# Patient Record
Sex: MALE | Race: WHITE
[De-identification: names, ages, dates, MRNs, and addresses within clinical notes are randomized per-mention and may not be internally consistent; named-entity substitution may affect disease eponyms.]

---

## 2019-04-07 ENCOUNTER — HOSPITAL ENCOUNTER (INPATIENT)
Dept: HOSPITAL 97 - ER | Age: 79
LOS: 10 days | Discharge: NURSING, REHAB OR LONG-TERM CARE | DRG: 470 | End: 2019-04-17
Attending: FAMILY MEDICINE | Admitting: INTERNAL MEDICINE
Payer: MEDICARE
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
## 2019-04-07 DIAGNOSIS — E78.5: ICD-10-CM

## 2019-04-07 DIAGNOSIS — R13.13: ICD-10-CM
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
## 2019-04-07 DIAGNOSIS — G20: ICD-10-CM
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
## 2019-04-07 DIAGNOSIS — E11.8: ICD-10-CM

## 2019-04-07 DIAGNOSIS — S72.041A: Primary | ICD-10-CM
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
## 2019-04-07 DIAGNOSIS — J43.9: ICD-10-CM

## 2019-04-07 DIAGNOSIS — Z87.891: ICD-10-CM

## 2019-04-07 DIAGNOSIS — R31.0: ICD-10-CM

## 2019-04-07 DIAGNOSIS — G40.909: ICD-10-CM

## 2019-04-07 DIAGNOSIS — R13.11: ICD-10-CM

## 2019-04-07 DIAGNOSIS — D63.8: ICD-10-CM

## 2019-04-07 DIAGNOSIS — F02.80: ICD-10-CM

## 2019-04-07 DIAGNOSIS — T83.83XA: ICD-10-CM

## 2019-04-07 LAB
BUN BLD-MCNC: 19 MG/DL (ref 7–18)
GLUCOSE SERPLBLD-MCNC: 136 MG/DL (ref 74–106)
HCT VFR BLD CALC: 40.5 % (ref 39.6–49)
INR BLD: 1.06
LYMPHOCYTES # SPEC AUTO: 1.1 K/UL (ref 0.7–4.9)
PMV BLD: 7.3 FL (ref 7.6–11.3)
POTASSIUM SERPL-SCNC: 4.5 MMOL/L (ref 3.5–5.1)
RBC # BLD: 4.61 M/UL (ref 4.33–5.43)

## 2019-04-07 PROCEDURE — 76770 US EXAM ABDO BACK WALL COMP: CPT

## 2019-04-07 PROCEDURE — 97530 THERAPEUTIC ACTIVITIES: CPT

## 2019-04-07 PROCEDURE — 71045 X-RAY EXAM CHEST 1 VIEW: CPT

## 2019-04-07 PROCEDURE — 88311 DECALCIFY TISSUE: CPT

## 2019-04-07 PROCEDURE — 99285 EMERGENCY DEPT VISIT HI MDM: CPT

## 2019-04-07 PROCEDURE — 87070 CULTURE OTHR SPECIMN AEROBIC: CPT

## 2019-04-07 PROCEDURE — 97167 OT EVAL HIGH COMPLEX 60 MIN: CPT

## 2019-04-07 PROCEDURE — 94640 AIRWAY INHALATION TREATMENT: CPT

## 2019-04-07 PROCEDURE — 87086 URINE CULTURE/COLONY COUNT: CPT

## 2019-04-07 PROCEDURE — 36415 COLL VENOUS BLD VENIPUNCTURE: CPT

## 2019-04-07 PROCEDURE — 92611 MOTION FLUOROSCOPY/SWALLOW: CPT

## 2019-04-07 PROCEDURE — 87205 SMEAR GRAM STAIN: CPT

## 2019-04-07 PROCEDURE — 85018 HEMOGLOBIN: CPT

## 2019-04-07 PROCEDURE — 80048 BASIC METABOLIC PNL TOTAL CA: CPT

## 2019-04-07 PROCEDURE — 70450 CT HEAD/BRAIN W/O DYE: CPT

## 2019-04-07 PROCEDURE — 88305 TISSUE EXAM BY PATHOLOGIST: CPT

## 2019-04-07 PROCEDURE — 72170 X-RAY EXAM OF PELVIS: CPT

## 2019-04-07 PROCEDURE — 72125 CT NECK SPINE W/O DYE: CPT

## 2019-04-07 PROCEDURE — 74230 X-RAY XM SWLNG FUNCJ C+: CPT

## 2019-04-07 PROCEDURE — 97110 THERAPEUTIC EXERCISES: CPT

## 2019-04-07 PROCEDURE — 97116 GAIT TRAINING THERAPY: CPT

## 2019-04-07 PROCEDURE — 93005 ELECTROCARDIOGRAM TRACING: CPT

## 2019-04-07 PROCEDURE — 80053 COMPREHEN METABOLIC PANEL: CPT

## 2019-04-07 PROCEDURE — 82962 GLUCOSE BLOOD TEST: CPT

## 2019-04-07 PROCEDURE — 81003 URINALYSIS AUTO W/O SCOPE: CPT

## 2019-04-07 PROCEDURE — 81015 MICROSCOPIC EXAM OF URINE: CPT

## 2019-04-07 PROCEDURE — 87088 URINE BACTERIA CULTURE: CPT

## 2019-04-07 PROCEDURE — 83735 ASSAY OF MAGNESIUM: CPT

## 2019-04-07 PROCEDURE — 97112 NEUROMUSCULAR REEDUCATION: CPT

## 2019-04-07 PROCEDURE — 76857 US EXAM PELVIC LIMITED: CPT

## 2019-04-07 PROCEDURE — 97163 PT EVAL HIGH COMPLEX 45 MIN: CPT

## 2019-04-07 PROCEDURE — 85610 PROTHROMBIN TIME: CPT

## 2019-04-07 PROCEDURE — 85025 COMPLETE CBC W/AUTO DIFF WBC: CPT

## 2019-04-07 PROCEDURE — 92610 EVALUATE SWALLOWING FUNCTION: CPT

## 2019-04-07 PROCEDURE — 85014 HEMATOCRIT: CPT

## 2019-04-07 PROCEDURE — 96374 THER/PROPH/DIAG INJ IV PUSH: CPT

## 2019-04-07 NOTE — EDPHYS
Physician Documentation                                                                           

 [facility]                                                                 

Name: Riky Castro                                                                             

Age: 78 yrs                                                                                       

Sex: Male                                                                                         

: 1940                                                                                   

MRN: [id number]                                                                                   

Arrival Date: 2019                                                                          

Time: 20:36                                                                                       

Account#: [id number]                                                                            

Bed 8                                                                                             

Private MD:                                                                                       

ED Physician Jayesh Driscoll                                                                       

HPI:                                                                                              

                                                                                             

22:29 This 78 yrs old  Male presents to ER via EMS with complaints of Fall Injury.   gs  

22:29 Details of fall: The patient fell from an upright position, while walking, fell down    gs  

      2-3 steps. Onset: The symptoms/episode began/occurred acutely, just prior to arrival.       

      Associated injuries: The patient sustained injury to the head, neck injury, right hip,      

      obvious fracture. Severity of symptoms: At their worst the symptoms were severe, in the     

      emergency department the symptoms are unchanged. The patient has not experienced            

      similar symptoms in the past. The patient has not recently seen a physician.                

                                                                                                  

Historical:                                                                                       

- Allergies:                                                                                      

20:48 No Known Allergies;                                                                     fc  

- Home Meds:                                                                                      

20:48 Colace 100 mg oral cap 1 cap once daily [Active]; aspirin 81 mg Oral TbEC 1 tab once    fc  

      daily [Active]; montelukast 10 mg oral tab 1 tab once daily [Active]; lisinopril 10 mg      

      Oral tab 1 tab once daily [Active]; rosuvastatin 20 mg oral tab 1 tab once daily            

      [Active]; ezetimibe oral oral 1 tab once daily [Active]; donepezil 10 mg oral tab 1 tab     

      once daily [Active]; carbidopa-levodopa  mg Oral tab 2 tabs 4 times per day           

      [Active]; Phenytoin  mg cap Oral 4 cap daily [Active];                                

- PMHx:                                                                                           

20:48 Parkinsons; High Cholesterol; Hyperlipidemia; Hypertension; Diabetes - NIDDM; Seizures; fc  

- PSHx:                                                                                           

20:48 Appendectomy;                                                                           fc  

                                                                                                  

- Immunization history: Last tetanus immunization: unknown.                                       

- Social history:: Smoking status: Patient/guardian denies using tobacco,                         

  Patient/guardian denies using alcohol, street drugs.                                            

- Ebola Screening: : Patient negative for fever greater than or equal to 101.5 degrees            

  Fahrenheit, and additional compatible Ebola Virus Disease symptoms Patient denies               

  exposure to infectious person Patient denies travel to an Ebola-affected area in the            

  21 days before illness onset.                                                                   

                                                                                                  

                                                                                                  

ROS:                                                                                              

22:29 All other systems are negative.                                                         gs  

                                                                                                  

Exam:                                                                                             

22:29 Head/Face:  Normocephalic, atraumatic. Eyes:  Pupils equal round and reactive to light, gs  

      extra-ocular motions intact.  Lids and lashes normal.  Conjunctiva and sclera are           

      non-icteric and not injected.  Cornea within normal limits.  Periorbital areas with no      

      swelling, redness, or edema. ENT:  Nares patent. No nasal discharge, no septal              

      abnormalities noted.  Tympanic membranes are normal and external auditory canals are        

      clear.  Oropharynx with no redness, swelling, or masses, exudates, or evidence of           

      obstruction, uvula midline.  Mucous membranes moist. Chest/axilla:  Normal chest wall       

      appearance and motion.  Nontender with no deformity.  No lesions are appreciated.           

      Cardiovascular:  Regular rate and rhythm with a normal S1 and S2.  No gallops, murmurs,     

      or rubs.  Normal PMI, no JVD.  No pulse deficits. Respiratory:  Lungs have equal breath     

      sounds bilaterally, clear to auscultation and percussion.  No rales, rhonchi or wheezes     

      noted.  No increased work of breathing, no retractions or nasal flaring. Abdomen/GI:        

      Soft, non-tender, with normal bowel sounds.  No distension or tympany.  No guarding or      

      rebound.  No evidence of tenderness throughout. Back:  No spinal tenderness.  No            

      costovertebral tenderness.  Full range of motion.                                           

22:29 Skin:  Warm, dry with normal turgor.  Normal color with no rashes, no lesions, and no       

      evidence of cellulitis. Neuro:  Awake and alert, GCS 15, oriented to person, place,         

      time, and situation.  Cranial nerves II-XII grossly intact.  Motor strength 5/5 in all      

      extremities.  Sensory grossly intact.  Cerebellar exam normal.  Normal gait.                

22:29 Constitutional: The patient appears alert, awake, in obvious distress, severely             

      distressed.                                                                                 

22:29 Neck: C-spine: C-collar placed PTA, Back board PTA                                          

22:29 Musculoskeletal/extremity: Circulation is intact in all extremities. Sensation intact.      

      Joints: the  right hip displays deformity, limited range of motion, pain at rest,           

      painful range of motion, tenderness.                                                        

                                                                                                  

Vital Signs:                                                                                      

20:30  / 76; Pulse 93; Resp 18; Temp 98.4(O); Pulse Ox 99% on R/A; Weight 72.57 kg (R); fc  

      Height 6 ft. 2 in. (187.96 cm) (R); Pain 3/10;                                              

22:00  / 87; Pulse 93; Resp 18; Pulse Ox 97% on R/A;                                    lp1 

22:45  / 80; Pulse 92; Resp 18; Pulse Ox 98% on R/A;                                    lp1 

23:30  / 71; Pulse 96; Resp 18; Pulse Ox 98% on R/A; Pain 9/10;                         lp1 

04                                                                                             

00:31  / 72; Pulse 92; Resp 18; Pulse Ox 98% on R/A;                                    lp1 

                                                                                             

20:30 Body Mass Index 20.54 (72.57 kg, 187.96 cm)                                             fc  

                                                                                                  

[employer] Coma Score:                                                                               

                                                                                             

20:30 Eye Response: spontaneous(4). Verbal Response: oriented(5). Motor Response: obeys       fc  

      commands(6). Total: 15.                                                                     

                                                                                                  

Trauma Score (Adult):                                                                             

20:30 Eye Response: spontaneous(1); Verbal Response: oriented(1); Motor Response: obeys       fc  

      commands(2); Systolic BP: > 89 mm Hg(4); Respiratory Rate: 10 to 29 per min(4); Sandy     

      Score: 15; Trauma Score: 12                                                                 

22:00 Eye Response: spontaneous(1); Verbal Response: oriented(1); Motor Response: obeys       lp1 

      commands(2); Systolic BP: > 89 mm Hg(4); Respiratory Rate: 10 to 29 per min(4); [employer]     

      Score: 15; Trauma Score: 12                                                                 

                                                                                                  

MDM:                                                                                              

20:42 Patient medically screened.                                                             gs  

22:29 Differential diagnosis: closed head injury, fracture, multiple trauma, sprain. Data     gs  

      reviewed: vital signs, nurses notes. Counseling: I had a detailed discussion with the       

      patient and/or guardian regarding: the historical points, exam findings, and any            

      diagnostic results supporting the discharge/admit diagnosis, lab results, radiology         

      results, the need for further work-up and treatment in the hospital. Response to            

      treatment: the patient's symptoms have mildly improved after treatment, and as a            

      result, I will admit patient.                                                               

                                                                                                  

                                                                                             

20:45 Order name: Basic Metabolic Panel; Complete Time: 23:08                                   

                                                                                             

20:45 Order name: CBC with Diff; Complete Time: 23:08                                           

                                                                                             

20:45 Order name: Protime (+inr); Complete Time: 23:09                                          

                                                                                             

20:45 Order name: XRAY CXR (1 view); Complete Time: 21:57                                       

                                                                                             

20:45 Order name: Hip Right 2 View XRAY; Complete Time: 21:57                                   

                                                                                             

20:45 Order name: CT Head C Spine                                                               

                                                                                             

20:45 Order name: EKG - Nurse/Tech; Complete Time: 22:08                                        

                                                                                                  

Administered Medications:                                                                         

21:46 Drug: morphine 4 mg Route: IVP; Site: right antecubital;                                lp1 

22:10 Follow up: Response: Pain is unchanged, physician notified                              lp1 

23:12 Drug: morphine 4 mg Route: IVP; Site: right antecubital;                                lp1 

23:40 Follow up: Response: Pain is unchanged, physician notified                              lp1 

                                                                                                  

                                                                                                  

Disposition:                                                                                      

19 22:39 Hospitalization ordered by Damian London for Inpatient Admission. Preliminary    

  diagnosis is Displaced fracture of base of neck of right femur.                                 

- Bed requested for Telemetry/MedSurg (Inpatient).                                                

- Status is Inpatient Admission.                                                              lp1 

- Condition is Stable.                                                                            

- Problem is new.                                                                                 

- Symptoms have improved.                                                                         

UTI on Admission? No                                                                              

                                                                                                  

                                                                                                  

                                                                                                  

Critical care time excluding procedures:                                                          

22:40 Critical care time: Bedside Care: 10 minutes, Consultation: 10 minutes, Family          gs  

      Intervention: 10 minutes. Total time: 30 minutes                                            

                                                                                                  

Signatures:                                                                                       

Dispatcher [employer]                           EDBarbara Hassan RN RN                                                      

Che Cartagena RN                         RN   [facility]                                                  

Crystal Morris RN RN                                                      

Jayesh Driscoll MD MD                                                      

                                                                                                  

Corrections: (The following items were deleted from the chart)                                    

23:17 22:39 Hospitalization Ordered by Damian London MD for Inpatient Admission. Preliminary cg  

      diagnosis is Displaced fracture of base of neck of right femur. Bed requested for           

      Telemetry/MedSurg (Inpatient). Status is Inpatient Admission. Condition is Stable.          

      Problem is new. Symptoms have improved. UTI on Admission? No.                             

                                                                                             

00:31  23:17 2019 22:39 Hospitalization Ordered by Damian London MD for Inpatient lp1 

      Admission. Preliminary diagnosis is Displaced fracture of base of neck of right femur.      

      Bed requested for Telemetry/MedSurg (Inpatient). Status is Inpatient Admission.             

      Condition is Stable. Problem is new. Symptoms have improved. UTI on Admission? No. cg       

                                                                                                  

**************************************************************************************************

## 2019-04-07 NOTE — ER
Diarrea en los niños  (Diarrhea, Child)  La diarrea consiste en evacuaciones intestinales frecuentes, blandas o acuosas. La diarrea puede hacer que el guillermo se sienta débil o puede deshidratarlo. La deshidratación puede provocarle cansancio y sed. El guillermo también puede orinar con menos frecuencia y tener sequedad en la boca. Generalmente, la diarrea dura 2 a 3 días. Sin embargo, puede durar más tiempo si se trata de un signo de algo más ky. Es importante tratar la diarrea nimco se lo haya indicado el pediatra.  INSTRUCCIONES PARA EL CUIDADO EN EL HOGAR  Comida y bebida   Siga estas recomendaciones nimco se lo haya indicado el pediatra:  · Si se lo indicaron, araceli al guillermo jackie solución de rehidratación oral (SRO). Esta es jackie bebida que se vende en farmacias y tiendas.  · Para prevenir la deshidratación, aliente al guillermo a beber mucho líquido. Evite darle al guillermo líquidos que contengan mucha azúcar o cafeína, nimco jugos y refrescos.  · Si el guillermo es pequeño, continúe amamantándolo o dándole leche maternizada. No le dé al guillermo más agua.  · Continúe alimentando guillermo nimco lo hace normalmente, nithin evite que consuma alimentos picantes o grasos, nimco linda fritas o pizza.  Instrucciones generales   · Asegúrese de que usted y el guillermo se laven las sveta con frecuencia. Use desinfectante para sveta si no dispone de agua y jabón.  · Asegúrese de que todas las personas que viven en farmer casa se laven diandra las sveta y con frecuencia.  · Administre los medicamentos de venta cornel y los recetados solamente nimco se lo haya indicado el pediatra.  · Kenyon que el guillermo tome un baño caliente para ayudar a disminuir el ardor o dolor causado por los episodios frecuentes de diarrea.  · Controle la afección del guillermo para detectar cualquier cambio.  · Kenyon que el guillermo pratibha la suficiente cantidad de líquido para mantener la orina de color inge o amarillo pálido.  · Concurra a todas las visitas de control nimco se lo haya indicado el pediatra.  Nurse's Notes                                                                                     

 [facility]                                                                 

Name: Riky Castro                                                                             

Age: 78 yrs                                                                                       

Sex: Male                                                                                         

: 1940                                                                                   

MRN: [id number]                                                                                   

Arrival Date: 2019                                                                          

Time: 20:36                                                                                       

Account#: [id number]                                                                            

Bed 8                                                                                             

Private MD:                                                                                       

Diagnosis: Displaced fracture of base of neck of right femur                                      

                                                                                                  

Presentation:                                                                                     

                                                                                             

20:30 Presenting complaint: EMS states: that pt was going down 3 steps and slipped. Negative  fc  

      LOC or dizziness. C/O pain to right hip. Noted shorting and external rotation. Care         

      prior to arrival: Cervical collar in place. Placed on backboard. Medication(s) given:       

      zofran 4 mg, Fentanyl 75 mcg IV initiated. 20 GA, in the right forearm, Glucose check:      

      118. Mechanism of Injury: Fall down 3 steps. Trauma event details: Injury occurred in       

      the [facility], Injury occurred: at home. Injury occurred: 2019           

      Injury occurred at: 19:55.                                                                  

20:30 Acuity: ZAC 2                                                                           fc  

20:30 Method Of Arrival: EMS: [facility]  

20:30 Transition of care: patient was not received from another setting of care. Onset of     fc  

      symptoms was 2019 at 19:55. Risk Assessment: Do you want to hurt yourself or      

      someone else? Patient reports no desire to harm self or others. Initial Sepsis Screen:      

      Does the patient meet any 2 criteria? HR > 90 bpm. No. Patient's initial sepsis screen      

      is negative. Does the patient have a suspected source of infection? No. Patient's           

      initial sepsis screen is negative.                                                          

                                                                                                  

Trauma Activation: Alert                                                                          

 Physician: ED Physician; Name: Yamila; Notified At:  20:34; Arrived At:                 

   20:34                                                                                

 Physician: General Surgeon; Name: ; Notified At:  20:34; Arrived At:                   

 Physician: Radiology; Name: Georgie; Notified At:  20:34; Arrived At:                   

   20:34                                                                                

 Physician: Respiratory; Name: ; Notified At:  20:34; Arrived At:                       

 Physician: Lab; Name: ; Notified At:  20:34; Arrived At:                               

                                                                                                  

Historical:                                                                                       

- Allergies:                                                                                      

20:48 No Known Allergies;                                                                     fc  

- Home Meds:                                                                                      

20:48 Colace 100 mg oral cap 1 cap once daily [Active]; aspirin 81 mg Oral TbEC 1 tab once    fc  

      daily [Active]; montelukast 10 mg oral tab 1 tab once daily [Active]; lisinopril 10 mg      

      Oral tab 1 tab once daily [Active]; rosuvastatin 20 mg oral tab 1 tab once daily            

      [Active]; ezetimibe oral oral 1 tab once daily [Active]; donepezil 10 mg oral tab 1 tab     

      once daily [Active]; carbidopa-levodopa  mg Oral tab 2 tabs 4 times per day           

      [Active]; Phenytoin  mg cap Oral 4 cap daily [Active];                                

- PMHx:                                                                                           

20:48 Parkinsons; High Cholesterol; Hyperlipidemia; Hypertension; Diabetes - NIDDM; Seizures; fc  

- PSHx:                                                                                           

20:48 Appendectomy;                                                                           fc  

                                                                                                  

- Immunization history: Last tetanus immunization: unknown.                                       

- Social history:: Smoking status: Patient/guardian denies using tobacco,                         

  Patient/guardian denies using alcohol, street drugs.                                            

- Ebola Screening: : Patient negative for fever greater than or equal to 101.5 degrees            

  Fahrenheit, and additional compatible Ebola Virus Disease symptoms Patient denies               

  exposure to infectious person Patient denies travel to an Ebola-affected area in the            

  21 days before illness onset.                                                                   

                                                                                                  

                                                                                                  

Screenin:30 Abuse screen: Denies threats or abuse. Tuberculosis screening: No symptoms or risk      fc  

      factors identified.                                                                         

20:42 Nutritional screening: No deficits noted. Fall Risk Fall in past 12 months (25 points). fc  

      Secondary diagnosis (15 points) impaired mobility, Parkinsons. IV access (20 points).       

      Ambulatory Aid- None/Bed Rest/Nurse Assist (0 pts). Gait- Weak (10 pts.). Mental            

      Status- Overestimates/Forgets Limitations (15 pts.). Total Bahena Fall Scale indicates       

      High Risk Score (45 or more points). Fall prevention measures have been instituted.         

      Side Rails Up X 2 Placed Close to Nursing Station Frequent Obs/Assessments Occuring         

      Family Present and informed to notify staff if the need to leave the bedside As             

      available patient and family educated on Fall Prevention Program and Strategies.            

                                                                                                  

Primary Survey:                                                                                   

20:30 NO uncontrolled hemorrhage observed. A: The patient is alert. Airway: patent, No        lp1 

      supplemental oxygen in use on arrival. Breathing/Chest: Respiratory pattern: regular,       

      Respiratory effort: spontaneous, unlabored, Breath sounds: clear, bilaterally. Chest        

      inspection: symmetrical rise and fall of the chest. Circulation: Skin temperature:          

      warm, dry. Disability Alert. Exposure/Environment: Obvious injury(ies) are noted at         

      this time: Right leg noted to be externally rotated; pulses palpable to dorsalis pedis.     

21:30 Reassessment Airway Airway Patent Breathing/Chest Respiratory pattern Regular           lp1 

      Respiratory effort Spontaneous Unlabored Circulation Temperature Warm Dry Disability        

      Alert.                                                                                      

                                                                                                  

Secondary Survey:                                                                                 

20:30 HEENT: No deficits noted. Gastrointestinal: No deficits noted. : No deficits noted.   lp1 

      Musculoskeletal: Range of motion: limited in right hip.                                     

                                                                                                  

Assessment:                                                                                       

20:30 General: Appears uncomfortable, Behavior is appropriate for age. Pain: Complains of     lp1 

      pain in right hip Pain currently is 9 out of 10 on a pain scale. Neuro: Level of            

      Consciousness is awake, alert, obeys commands, Oriented to person, place, situation,        

      Pupils are PERRLA. EENT: No deficits noted. Cardiovascular: Patient's skin is warm and      

      dry. Pulses are 2+ in right dorsalis pedis artery and left dorsalis pedis artery.           

      Respiratory: Airway is patent Respiratory effort is even, unlabored, Respiratory            

      pattern is regular, symmetrical, Breath sounds are clear bilaterally. GI: Abdomen is        

      flat. : No deficits noted. Derm: Skin is fragile, is thin, Skin is dry, Skin is pale.     

      Musculoskeletal: Circulation, motion, and sensation intact. Capillary refill < 3            

      seconds, in bilateral toes.                                                                 

21:00 Reassessment: Neisha from [facility] called for update on patient; Will call [facility] 

      at [phone].                                                                            

21:45 Reassessment: Patient states discomfort with C-collar in place; aware of need for CT    lp1 

      results before removing C-collar;.                                                          

22:16 Reassessment: Patient states no relief from Morphine administered; continued pain to    lp1 

      right hip; Provider notified.                                                               

22:35 Reassessment: Patient appears more comfortable after removal of C-collar.               lp1 

23:32 Reassessment: Patient states no relief from Morphine administered; Patient and daughter lp1 

      aware of pending admission;.                                                                

23:33 Cardiovascular: Pulses are 2+ in right dorsalis pedis artery and left dorsalis pedis    lp1 

      artery.                                                                                     

                                                                                             

00:12 Reassessment: Neisha from [facility] updated on patient begin admitted to    [address]                                                                                   

                                                                                                  

Vital Signs:                                                                                      

                                                                                             

20:30  / 76; Pulse 93; Resp 18; Temp 98.4(O); Pulse Ox 99% on R/A; Weight 72.57 kg (R); fc  

      Height 6 ft. 2 in. (187.96 cm) (R); Pain 3/10;                                              

22:00  / 87; Pulse 93; Resp 18; Pulse Ox 97% on R/A;                                    lp1 

22:45  / 80; Pulse 92; Resp 18; Pulse Ox 98% on R/A;                                    1 

23:30  / 71; Pulse 96; Resp 18; Pulse Ox 98% on R/A; Pain 9/10;                         lp1 

04                                                                                             

00:31  / 72; Pulse 92; Resp 18; Pulse Ox 98% on R/A;                                    [facility] 

                                                                                             

20:30 Body Mass Index 20.54 (72.57 kg, 187.96 cm)                                             fc  

                                                                                                  

Sandy Coma Score:                                                                               

                                                                                             

20:30 Eye Response: spontaneous(4). Verbal Response: oriented(5). Motor Response: obeys       fc  

      commands(6). Total: 15.                                                                     

                                                                                                  

Trauma Score (Adult):                                                                             

20:30 Eye Response: spontaneous(1); Verbal Response: oriented(1); Motor Response: obeys       fc  

      commands(2); Systolic BP: > 89 mm Hg(4); Respiratory Rate: 10 to 29 per min(4); [place]     

      Score: 15; Trauma Score: 12                                                                 

22:00 Eye Response: spontaneous(1); Verbal Response: oriented(1); Motor Response: obeys       lp1 

      commands(2); Systolic BP: > 89 mm Hg(4); Respiratory Rate: 10 to 29 per min(4); [place]     

      Score: 15; Trauma Score: 12                                                                 

                                                                                                  

ED Course:                                                                                        

20:30 Patient has correct armband on for positive identification. Bed in low position. Call   fc  

      light in reach. Side rails up X2.                                                           

20:30 Arm band placed on Patient placed in an exam room, on a stretcher.                      fc  

20:30 Patient maintains SpO2 saturation greater than 95% on room air.                         fc  

20:32 Thermoregulation: warm blanket given to patient.                                        fc  

20:36 Patient arrived in ED.                                                                  fc  

20:36 Jayesh Driscoll MD is Attending Physician.                                              gs  

20:39 Triage completed.                                                                       fc  

20:42 Cardiac monitor on. Pulse ox on. NIBP on.                                               fc  

21:00 Maintain EMS IV. Dressing intact. Site clean \T\ dry. Gauge \T\ site: 20g R FA.             lp
1

21:03 XRAY CXR (1 view) In Process Unspecified.                                               EDMS

21:03 Hip Right 2 View XRAY In Process Unspecified.                                           EDMS

21:06 CT Head C Spine In Process Unspecified.                                                 EDMS

21:07 Che Cartagena, RN is Primary Nurse.                                                       lp1 

21:53 Inserted saline lock: 20 gauge in right antecubital area, using aseptic technique.      ag4 

      Blood collected.                                                                            

21:53 EKG done, by ED staff, reviewed by Jayesh Driscoll MD.                                    ag4 

22:39 Damian London MD is Hospitalizing Provider.                                          gs  

23:31 No provider procedures requiring assistance completed. Patient admitted, IV remains in  lp1 

      place.                                                                                      

                                                                                                  

Administered Medications:                                                                         

21:46 Drug: morphine 4 mg Route: IVP; Site: right antecubital;                                lp1 

22:10 Follow up: Response: Pain is unchanged, physician notified                              lp1 

23:12 Drug: morphine 4 mg Route: IVP; Site: right antecubital;                                lp1 

23:40 Follow up: Response: Pain is unchanged, physician notified                              lp1 

                                                                                                  

                                                                                                  

Intake:                                                                                           

23:30 PO: 0ml; Total: 0ml.                                                                    lp1 

                                                                                                  

Output:                                                                                           

23:30 Urine: 0ml; Total: 0ml.                                                                 lp1 

                                                                                                  

Outcome:                                                                                          

22:39 Decision to Hospitalize by Provider.                                                    gs  

23:32 Condition: stable                                                                       lp1 

23:32 Instructed on the need for admit.                                                           

23:44 Patient's length of stay in the Emergency Department was greater than 2 hours. pending  lp1 

      imaging results Patient's length of stay extended due to                                    

04/                                                                                             

00:14 Admitted to Tele accompanied by tech, via stretcher, room 422, with chart, Report       lp1 

      called to  Aleyda Dover RN by Mary Kate Goff RN                                             

00:31 Patient left the ED.                                                                    lp1 

                                                                                                  

Signatures:                                                                                       

Dispatcher [employer]                           Barbara Johnson, EMELY HAN                                                      

Che Cartagena, EMELY                         RN   lp1                                                  

Jayesh Driscoll MD MD                                                      

Fahad España                                 ag4                                                  

                                                                                                  

Corrections: (The following items were deleted from the chart)                                    

                                                                                             

23:34 20:30 Cardiovascular: Patient's skin is warm and dry. Pulses are absent in right        lp1 

      dorsalis pedis artery and left dorsalis pedis artery lp1                                    

23:34 23:32 Reassessment: Patient states no relief from Morphine administered; Patient and    lp1 

      daughter aware of pending admission; lp1                                                    

                                                                                                  

************************************************************************************************** [place] es importante.  SOLICITE ATENCIÓN MÉDICA SI:  · La diarrea del guillermo dura más de 3 días.  · El guillermo tiene fiebre.  · El guillermo no quiere beber líquido o no puede retener líquido.  · El guillermo se siente confundido o mareado.  · El guillermo tiene dolor de shona.  · El guillermo tiene calambres musculares.  SOLICITE ATENCIÓN MÉDICA DE INMEDIATO SI:  · Nota signos de deshidratación en el guillermo, tales nimco:  ¨ Ausencia de orina en un lapso de 8 a 12 horas.  ¨ Labios agrietados.  ¨ Ausencia de lágrimas cuando llora.  ¨ Boca seca.  ¨ Ojos hundidos.  ¨ Somnolencia.  ¨ Debilidad.  · El guillermo comienza a vomitar.  · Las heces del guillermo tienen willi o son de color christopher, o tienen aspecto alquitranado.  · El guillermo tiene dolor en el abdomen.  · El guillermo tiene dificultad para respirar o respira muy rápidamente.  · El corazón del guillermo late muy rápidamente.  · La piel del guillermo se siente fría y húmeda.  · El guillermo parece estar confundido.  Esta información no tiene nimco fin reemplazar el consejo del médico. Asegúrese de hacerle al médico cualquier pregunta que tenga.  Document Released: 09/27/2006 Document Revised: 04/10/2017 Document Reviewed: 08/23/2016  [employer] Interactive Patient Education © 2017 [employer]      Opciones de alimentos para ayudar a aliviar la diarrea - Niños  (Food Choices to Help Relieve Diarrhea, Pediatric)  Cuando el guillermo tiene diarrea, los alimentos que ingiere son de gran importancia. Elegir los alimentos y las bebidas adecuados ayuda a aliviar la diarrea del guillermo. Asegurarse de que pratibha abundante cantidad de líquidos también es importante. Es fácil que un guillermo con diarrea pierda gran cantidad de líquido y se deshidrate.  ¿QUÉ PAUTAS GENERALES MARGIE SEGUIR?  Si el guillermo es rochelle de 1 año:  · Siga alimentándolo con leche materna o leche maternizada nimco siempre.  · Puede darle al bebé jackie solución de rehidratación oral para ayudar a mantenerlo hidratado. Esta solución se puede comprar en las farmacias, en las tiendas  minoristas y por Internet.  · No le dé al bebé jugos, bebidas deportivas ni refrescos. Estas bebidas pueden empeorar la diarrea.  · Si come algunos alimentos sólidos, puede seguir ofreciéndole esos alimentos si no empeoran la diarrea. Algunos alimentos recomendados son el arroz, los guisantes, las linda, el arleth o los huevos. No le dé al bebé alimentos con alto contenido de grasas, fibras o azúcar. Si el guillermo tiene heces acuosas cada vez que come, amamántelo o aliméntelo con la leche maternizada nimco siempre. Ofrézcale alimentos sólidos cuando las heces aneudy sólidas  Si el guillermo tiene 1 año o más:  Fluidos  · Dé al guillermo 1 taza (8 onzas) de líquido por cada episodio de diarrea.  · Asegúrese de que el guillermo pratibha la suficiente cantidad de líquido para mantener la orina de color inge o amarillo pálido.  · Puede darle al guillermo jackie solución de rehidratación oral para ayudar a mantenerlo hidratado. Esta solución se puede comprar en las farmacias, en las tiendas minoristas y por Internet.  · Evite darle bebidas que contengan azúcar, nimco las bebidas deportivas, los jugos de frutas, los productos lácteos enteros y las gaseosas.  · Evite darle al guillermo bebidas con cafeína.  Alimentos  · Evite darle al guillermo alimentos y bebidas que se muevan rápidamente por el tubo digestivo. [place] podría empeorar la diarrea. Entre los que se incluyen:  ¨ Bebidas con cafeína.  ¨ Alimentos ricos en fibra, nimco frutas y vegetales, nueces, semillas, panes y cereales integrales.  ¨ Alimentos y bebidas endulzados con alcoholes de azúcar, tales nimco xilitol, sorbitol, y manitol.  · [place] al guillermo alimentos que ayuden a espesar las heces. Estos incluyen puré de manzanas y alimentos con almidón, nimco arroz, tostadas, pasta, cereales bajos en azúcar, saba, sémola de maíz, linda al horno, galletas y panecillos.  · Cuando dé al guillermo alimentos hechos con granos, asegúrese de que tengan menos de 2 g de fibra por porción.  · Agregue alimentos ricos en  probióticos (nimco el yogur y los productos lácteos fermentados) a la dieta del guillermo para ayudar a aumentar las bacterias saludables en el tracto gastrointestinal.  · [place] que el guillermo coma pequeñas cantidades de comida con frecuencia.  · No dé al guillermo alimentos que estén muy calientes o muy fríos. Estos pueden irritar aún más la membrana que cubre el estómago.  ¿QUÉ ALIMENTOS SE RECOMIENDAN?  Solo araceli al guillermo alimentos que aneudy adecuados para farmer edad. Si tiene preguntas acerca de un alimento, hable con el nutricionista o el pediatra.  Cereales  Panes y productos hechos con harina marion. Fideos. Arroz ortiz. Galletas saladas. Pretzels. Saba. Cereales fríos. Galletas Brady.  Vegetales  Puré de linda sin cáscara. Vegetales diandra cocidos sin semillas ni cáscara. Jugo de vegetales.  Frutas  Melón. Puré de manzana. Banana. Jugo de frutas (excepto el jugo de ciruela) sin pulpa. Frutas en compota.  Keila y otros alimentos con proteínas  Huevo trista. Keila blandas diandra cocidas. Pescado, huevo o productos de soja hechos sin grasa añadida. Mantequilla de lyn secos, sin trozos.  Lácteos  Leche materna o leche maternizada. Yecenia de leche. Leche semidescremada, descremada, en polvo y evaporada. Leche de soja. Leche sin lactosa. Yogur con cultivos vivos activos. Queso. Helado bajo en grasa.  Bebidas  Bebidas sin cafeína. Bebidas rehidratantes.  Grasas y aceites  Aceite. Mantequilla. Queso crema. Margarina. Mayonesa.  Los artículos mencionados arriba pueden no ser jackie lista completa de las bebidas o los alimentos recomendados. Comuníquese con el nutricionista para conocer más opciones.  ¿QUÉ ALIMENTOS NO SE RECOMIENDAN?  Cereales  Pan de salvado o integral, panecillos, galletas o pasta. Arroz integral o sae. Cebada, saba y otros cereales integrales. Cereales hechos de granos integrales o salvado. Panes o cereales hechos con semillas y lyn secos. Palomitas de maíz.  Vegetales  Vegetales crudos. Verduras fritas.  Remolachas. Brócoli. Repollitos de Bruselas. Repollo. Coliflor. Hojas de berza, mostaza o nabo. Maíz. Cáscara de linda.  Frutas  Todas las frutas crudas, excepto las bananas y los melones. Frutas secas, incluidas las ciruelas y las pasas. Jugo de ciruelas. Jugo de frutas con pulpa. Frutas en almíbar espeso.  Keila y otras mahoney de proteínas  Carne de kemal, aves o pescado. Embutidos (nimco la mortadela y el joesph). Salchicha y tocino. Perros calientes. Keila grasas. Lyn secos. Mantequillas de lyn secos espesas.  Lácteos  Leche entera. Mitad leche y mitad crema. Crema. Crema ácida. Helado común (leche entera). Yogur con lyn rojos, frutas secas o lyn secos.  Bebidas  Bebidas con cafeína, sorbitol o jarabe de maíz de alto contenido de fructosa.  Grasas y aceites  Comidas fritas. Alimentos grasosos.  Otros  Alimentos endulzados artificialmente con sorbitol o xilitol. Miel. Alimentos con cafeína, sorbitol o jarabe de maíz de alto contenido de fructosa.  Los artículos mencionados arriba pueden no ser jackie lista completa de las bebidas y los alimentos que se deben evitar. Comuníquese con el nutricionista para recibir más información.     Esta información no tiene nimco fin reemplazar el consejo del médico. Asegúrese de hacerle al médico cualquier pregunta que tenga.     Document Released: 12/18/2006 Document Revised: 01/08/2016  Elsevier Interactive Patient Education ©2016 [employer]

## 2019-04-07 NOTE — RAD REPORT
EXAM DESCRIPTION:  RAD - Hip Right 2 View - 4/7/2019 9:03 pm

 

CLINICAL HISTORY:  PAIN

Trauma, fall, pain

 

COMPARISON:  No comparisons

 

FINDINGS:  Subcapital fracture is present of the proximal right femur with varus angulation. No dislo
cation seen.

## 2019-04-07 NOTE — P.HP
Certification for Inpatient


Patient admitted to: Inpatient


With expected LOS: >2 Midnights


Practitioner: I am a practitioner with admitting privileges, knowledge of 

patient current condition, hospital course, and medical plan of care.


Services: Services provided to patient in accordance with Admission 

requirements found in Title 42 Section 412.3 of the Code of Federal Regulations





Patient History


Date of Service: 04/07/19


Reason for admission: right hip fracture


History of Present Illness: 





Mr Castro is a 78 years old male with history of COPD, HTN, dyslipidemia, 

seizure disorder, DM II, who unfortunately fell to the ground landing over his 

right side. Immediately he start feeling significant right hip pain, and he was 

unable to bear weight. He denied dizziness or palpitations prior to the fall. 

He states that misstep and fell. Lab work is mostly unremarkable. Right pelvic 

XR remarkable for subcapital fracture of right proximal femur. He is afebrile, 

/64, O2 sat 99% on RA.


Home medications list reviewed: Yes





- Past Medical/Surgical History


-: COPD


-: HTN


-: Parkinson's disease


-: dyslipidemia


-: DM II


-: seizures


-: appendectomy





- Family History


Family History: Reviewed- Non-Contributory





- Social History


Smoking Status: Former smoker


CD- Drugs: No


Place of Residence: Home





Review of Systems


10-point ROS is otherwise unremarkable





Physical Examination





- Physical Exam


General: Alert, In no apparent distress


HEENT: Atraumatic, PERRLA, Mucous membr. moist/pink, EOMI, Sclerae nonicteric


Neck: Supple, 2+ carotid pulse no bruit, No LAD, Without JVD or thyroid 

abnormality


Respiratory: Clear to auscultation bilaterally, Normal air movement


Cardiovascular: Regular rate/rhythm, Normal S1 S2


Gastrointestinal: Normal bowel sounds, No tenderness


Musculoskeletal: No tenderness


Integumentary: No rashes


Neurological: Normal gait, Normal speech, Normal strength at 5/5 x4 extr, 

Normal tone, Normal affect


Lymphatics: No axilla or inguinal lymphadenopathy





- Studies


Laboratory Data (last 24 hrs)





04/07/19 21:48: PT 12.5, INR 1.06


04/07/19 21:48: WBC 8.3, Hgb 13.5 L, Hct 40.5, Plt Count 427 H


04/07/19 21:48: Sodium 143, Potassium 4.5, BUN 19 H, Creatinine 0.73, Glucose 

136 H








Assessment and Plan





- Problems (Diagnosis)


(1) Hip fracture


Current Visit: Yes   Status: Acute   


Qualifiers: 


   Encounter type: initial encounter   Fracture type: closed   Laterality: 

right   Qualified Code(s): S72.001A - Fracture of unspecified part of neck of 

right femur, initial encounter for closed fracture   





(2) Diabetes mellitus


Current Visit: Yes   Status: Acute   


Qualifiers: 


   Diabetes mellitus type: type 2   Diabetes mellitus long term insulin use: 

without long term use   Diabetes mellitus complication status: with unspecified 

complications   Qualified Code(s): E11.8 - Type 2 diabetes mellitus with 

unspecified complications   





(3) Seizure disorder


Current Visit: Yes   Status: Acute   





(4) COPD (chronic obstructive pulmonary disease)


Current Visit: Yes   Status: Acute   


Qualifiers: 


   COPD type: emphysema   Emphysema type: unspecified   Qualified Code(s): 

J43.9 - Emphysema, unspecified   





(5) HTN (hypertension)


Current Visit: Yes   Status: Acute   


Qualifiers: 


   Hypertension type: essential hypertension   Qualified Code(s): I10 - 

Essential (primary) hypertension   





(6) Parkinson disease


Current Visit: Yes   Status: Acute   





- Plan





The patient will be admitted to the hospital due to right hip fracture. Will 

keep him NPO, start IV fluids, pain control, SSI for BS control. Dr Lucas was 

consulted, he will see the patient in AM.





- Advance Directives


Does patient have a Living Will: No


Does patient have a Durable POA for Healthcare: No





- Code Status/Comfort Care


Code Status Assessed: Yes


Code Status: Full Code

## 2019-04-07 NOTE — RAD REPORT
EXAM DESCRIPTION:  RAD - Chest Single View - 4/7/2019 9:03 pm

 

CLINICAL HISTORY:  MALAISE

Chest pain.

 

COMPARISON:  No comparisons

 

FINDINGS:  Portable technique limits examination quality.

 

Emphysematous changes are present throughout the lungs. The heart is normal in size. Healed posterior
 left rib fractures.

 

IMPRESSION:  COPD.

## 2019-04-08 LAB
BUN BLD-MCNC: 21 MG/DL (ref 7–18)
GLUCOSE SERPLBLD-MCNC: 133 MG/DL (ref 74–106)
HCT VFR BLD CALC: 39.2 % (ref 39.6–49)
LYMPHOCYTES # SPEC AUTO: 1.2 K/UL (ref 0.7–4.9)
MAGNESIUM SERPL-MCNC: 2 MG/DL (ref 1.8–2.4)
PMV BLD: 7.2 FL (ref 7.6–11.3)
POTASSIUM SERPL-SCNC: 4 MMOL/L (ref 3.5–5.1)
RBC # BLD: 4.42 M/UL (ref 4.33–5.43)
UA COMPLETE W REFLEX CULTURE PNL UR: (no result)
UA DIPSTICK W REFLEX MICRO PNL UR: (no result)

## 2019-04-08 RX ADMIN — HUMAN INSULIN SCH: 100 INJECTION, SOLUTION SUBCUTANEOUS at 06:00

## 2019-04-08 RX ADMIN — LISINOPRIL SCH MG: 10 TABLET ORAL at 11:42

## 2019-04-08 RX ADMIN — SODIUM CHLORIDE SCH MLS: 0.9 INJECTION, SOLUTION INTRAVENOUS at 01:13

## 2019-04-08 RX ADMIN — KETOROLAC TROMETHAMINE PRN MG: 30 INJECTION, SOLUTION INTRAMUSCULAR at 17:46

## 2019-04-08 RX ADMIN — DOCUSATE SODIUM SCH MG: 100 CAPSULE, LIQUID FILLED ORAL at 11:43

## 2019-04-08 RX ADMIN — CARBIDOPA AND LEVODOPA SCH TAB: 25; 100 TABLET ORAL at 17:11

## 2019-04-08 RX ADMIN — Medication SCH: at 21:00

## 2019-04-08 RX ADMIN — HUMAN INSULIN SCH: 100 INJECTION, SOLUTION SUBCUTANEOUS at 11:30

## 2019-04-08 RX ADMIN — HUMAN INSULIN SCH: 100 INJECTION, SOLUTION SUBCUTANEOUS at 21:00

## 2019-04-08 RX ADMIN — Medication SCH ML: at 09:00

## 2019-04-08 RX ADMIN — CARBIDOPA AND LEVODOPA SCH TAB: 25; 100 TABLET ORAL at 21:01

## 2019-04-08 RX ADMIN — HUMAN INSULIN SCH: 100 INJECTION, SOLUTION SUBCUTANEOUS at 00:00

## 2019-04-08 RX ADMIN — MONTELUKAST SODIUM SCH MG: 10 TABLET, FILM COATED ORAL at 11:43

## 2019-04-08 RX ADMIN — SODIUM CHLORIDE SCH MLS: 0.9 INJECTION, SOLUTION INTRAVENOUS at 21:01

## 2019-04-08 RX ADMIN — EZETIMIBE SCH MG: 10 TABLET ORAL at 11:42

## 2019-04-08 RX ADMIN — HUMAN INSULIN SCH: 100 INJECTION, SOLUTION SUBCUTANEOUS at 16:30

## 2019-04-08 RX ADMIN — SODIUM CHLORIDE SCH MLS: 0.9 INJECTION, SOLUTION INTRAVENOUS at 11:14

## 2019-04-08 RX ADMIN — KETOROLAC TROMETHAMINE PRN MG: 30 INJECTION, SOLUTION INTRAMUSCULAR at 10:58

## 2019-04-08 RX ADMIN — CARBIDOPA AND LEVODOPA SCH TAB: 25; 100 TABLET ORAL at 11:43

## 2019-04-08 NOTE — P.PN
Subjective


Date of Service: 04/08/19


Chief Complaint: right hip fracture


Subjective: No C/O voiced, Tolerating diet, Doing well, Other (Rescheduled for 

Surgery portia AM. Denies any SOB, CP, N/V or any other symptoms)





Review of Systems


10-point ROS is otherwise unremarkable





Physical Examination





- Vital Signs


Temperature: 98.6 F


Blood Pressure: 149/68


Pulse: 83


Respirations: 17


Pulse Ox (%): 99





- Physical Exam


General: Alert, In no apparent distress


HEENT: Atraumatic, PERRLA, EOMI


Neck: Supple, JVD not distended


Respiratory: Clear to auscultation bilaterally, Normal air movement


Cardiovascular: Regular rate/rhythm, Normal S1 S2


Gastrointestinal: Normal bowel sounds, No tenderness


Musculoskeletal: No tenderness


Integumentary: No rashes


Neurological: Normal speech, Normal tone, Normal affect


Lymphatics: No axilla or inguinal lymphadenopathy





- Studies


Laboratory Data (last 24 hrs)





04/07/19 21:48: PT 12.5, INR 1.06


04/07/19 21:48: WBC 8.3, Hgb 13.5 L, Hct 40.5, Plt Count 427 H


04/07/19 21:48: Sodium 143, Potassium 4.5, BUN 19 H, Creatinine 0.73, Glucose 

136 H





Medications List Reviewed: Yes





Assessment And Plan





- Current Problems (Diagnosis)


(1) Hip fracture


Current Visit: Yes   Status: Acute   


Plan: 


Hip Fracture s.p Fall 


-Fall precautions. 


-orthopedics Consulted. Reccs Appreciated 


   -OR procedure portia AM 


-NPO after midnight 


-repeat lab in AM 





Qualifiers: 


   Encounter type: initial encounter   Fracture type: closed   Laterality: 

right   Qualified Code(s): S72.001A - Fracture of unspecified part of neck of 

right femur, initial encounter for closed fracture   





(2) COPD (chronic obstructive pulmonary disease)


Current Visit: Yes   Status: Chronic   


Qualifiers: 


   COPD type: emphysema   Emphysema type: unspecified   Qualified Code(s): 

J43.9 - Emphysema, unspecified   





(3) Diabetes mellitus


Current Visit: Yes   Status: Chronic   


Qualifiers: 


   Diabetes mellitus type: type 2   Diabetes mellitus long term insulin use: 

without long term use   Diabetes mellitus complication status: with unspecified 

complications   Qualified Code(s): E11.8 - Type 2 diabetes mellitus with 

unspecified complications   





(4) HTN (hypertension)


Current Visit: Yes   Status: Chronic   


Qualifiers: 


   Hypertension type: essential hypertension   Qualified Code(s): I10 - 

Essential (primary) hypertension   





(5) Parkinson disease


Current Visit: Yes   Status: Chronic   





(6) Seizure disorder


Current Visit: Yes   Status: Chronic   





- Plan





Pending Improvement. OR procedure portia AM. NPO after midnight. F.u post 

procedure 





Discharge Plan: Home


Plan to discharge in: Greater than 2 days





- Code Status/Comfort Care


Code Status Assessed: Yes


Critical Care: No

## 2019-04-08 NOTE — RAD REPORT
EXAM DESCRIPTION:  CT - Head C Spine Mpr Wo Con - 4/7/2019 9:31 pm

 

CLINICAL HISTORY:  The patient is 78 years old and is Male; PAIN

 

TECHNIQUE:  Axial computed tomography images of the head/brain and cervical spine without intravenous
 contrast.   Sagittal and coronal reformatted images were created and reviewed.   This CT exam was pe
rformed using one or more of the following dose reduction techniques:   automated exposure control, a
djustment of the mA and/or kV according to patient size, and/or use of iterative reconstruction techn
ique.

 

COMPARISON:  No relevant prior studies available.

 

FINDINGS:  BRAIN:   There is diffuse cerebral atrophy present, consistent with this patient's age.   
There is patchy hypoattenuation of the deep white matter which is non-specific, but most likely owing
 to chronic small vessel ischemic change in a patient of this age group.   Evidence of prior lacunar 
infarct within the region of left basal ganglia is noted. No intracranial hemorrhage, mass effect, or
 midline shift is seen. There are no extra-axial fluid collections.

   VENTRICLES:   Unremarkable.   No ventriculomegaly.

   SKULL:   No acute fracture.

   SINUSES:   Mucoperiosteal thickening of the ethmoid air cells and right maxillary sinus is present
. Large slightly heterogeneous density within the left maxillary sinus is present. The uvula appears 
to be significantly enlarged. The appearance suggests possible continuity with the ostiomeatal comple
x and paranasal sinuses.

   MASTOID AIR CELLS:   Unremarkable as visualized.   No mastoid effusion.

   VERTEBRAE:   The vertebral body heights and alignment are maintained.   No acute fracture.

   DISCS/SPINAL CANAL/NEURAL FORAMINA:   There is multi-level intervertebral disc height loss. There 
are disc-osteophyte complexes at several levels, with associated mild spinal canal narrowing. There i
s also facet hypertrophy and uncovertebral joint osteophytosis, with associated multilevel neural for
aminal narrowing.

   SOFT TISSUES:   The soft tissues are normal.

   LUNG APICES:   The lung apices are clear.

 

IMPRESSION:  1.   No acute intracranial findings.

2.   Findings suggestive of a large left maxillary sinus mucus retention cyst versus polyp.

3.   Moderate spondylosis of the cervical spine without acute findings.

4.   Apparent enlargement of the uvula with concern for continuity with the ostiomeatal complex and t
he paranasal sinuses. This raises the possibility of an antrochoanal polyp. Recommend direct visualiz
ation.

 

Electronically signed by:   Jessica De Santiago MD   4/7/2019 9:43 PM CDT Workstation: [id number]

 

 

Due to temporary technical issues with the PACS/Fluency reporting system, reports are being signed by
 the in house radiologist as a courtesy to ensure prompt reporting. The interpreting radiologist is f
ully responsible for the content of the report.

## 2019-04-08 NOTE — P.CNS
Date of Consult: 04/08/19


Reason for Consult: Right hip fracture


Requesting Physician: Jayesh Driscoll


Chief Complaint: Right hip fracture


History of Present Illness: 





This 78-year-old  male was walking in the dark and forgot about 3 

steps down at the end of the [place] to step off in the air and tumble down those 

steps.  He denies dizziness or loss of consciousness and currently denies 

injury to head or neck or back.  Brought to the emergency department at [facility], x-rays have shown a displaced femoral neck fracture right 

hip proximal femur. 


Allergies





No Known Allergies Allergy (Unverified 04/07/19 23:30)


 





Home Medications: 








Aspirin 81 mg PO DAILY 04/08/19 


Carbidopa/Levodopa [Carbidopa-Levo  mg Odt] 2 tab PO QID 04/08/19 


Docusate [Colace Cap] 100 mg PO DAILY 04/08/19 


Donepezil HCl 10 mg PO DAILY 04/08/19 


Ezetimibe [Zetia] 10 mg PO DAILY 04/08/19 


Lisinopril [Prinivil] 10 mg PO DAILY 04/08/19 


Montelukast [Singulair] 10 mg PO DAILY 04/08/19 


PHENYTOIN ER Cap [Dilantin ER Cap] 4 cap PO BEDTIME 04/08/19 


Rosuvastatin Calcium [Crestor] 20 mg PO BEDTIME 04/08/19 








- Past Medical/Surgical History


Diabetic: Yes


-: COPD


-: HTN


-: Parkinson's disease


-: dyslipidemia


-: DM II


-: seizures


-: appendectomy





- Family History


  ** Mother


Medical History: Heart disease, Diabetes





  ** Father


Medical History: Heart disease, Diabetes





- Social History


Alcohol use: No


CD- Drugs: No


Caffeine use: Yes


Place of Residence: Home





Review of Systems


10-point ROS is otherwise unremarkable





Physical Examination














Temp Pulse Resp BP Pulse Ox


 


 98.6 F   83   17   149/68 H  99 


 


 04/08/19 16:08  04/08/19 16:08  04/08/19 16:08  04/08/19 16:08  04/08/19 16:08








General: In no apparent distress, Oriented x3, Cooperative


HEENT: Atraumatic, Normocephalic


Neck: Supple


Respiratory: Clear to auscultation bilaterally, Normal air movement


Cardiovascular: Normal pulses


Capillary refill: Brisk


Gastrointestinal: Normal bowel sounds, Soft and benign, Non-distended


Musculoskeletal: Swelling (mild pedal edema with the right foot externally 

rotated and shortened right lower extremity.), Tenderness (with any significant 

movement of the right lower extremity.)


Integumentary: No breakdown, No significant lesion


Neurological: Sensation intact


External genitalia: Deferred


Rectal: Deferred


Laboratory Data (last 24 hrs)





04/07/19 21:48: PT 12.5, INR 1.06


04/07/19 21:48: WBC 8.3, Hgb 13.5 L, Hct 40.5, Plt Count 427 H


04/07/19 21:48: Sodium 143, Potassium 4.5, BUN 19 H, Creatinine 0.73, Glucose 

136 H





Imagings Data: 


CT head and neck done 7/7/19 at [facility] radiology


   IMPRESSION:  1.   No acute intracranial findings.


2.   Findings suggestive of a large left maxillary sinus mucus retention cyst 

versus polyp.


3.   Moderate spondylosis of the cervical spine without acute findings.


4.   Apparent enlargement of the uvula with concern for continuity with the 

ostiomeatal complex and the paranasal sinuses. This raises the possibility of 

an antrochoanal polyp. Recommend direct visualization





RAD - Hip Right 2 View - 4/7/2019 9:03 pm


 CLINICAL HISTORY:  PAIN


Trauma, fall, pain


 COMPARISON:  No comparisons


 FINDINGS:  Subcapital fracture is present of the proximal right femur with 

varus angulation. No dislocation seen.








Dictated By: Vaughn Ruiz MD   04/07/19 2110





- Problems


(1) Closed displaced fracture of right femoral neck


Current Visit: Yes   Status: Acute   





(2) Hip fracture


Onset Date: ~04/07/19   Current Visit: Yes   Status: Acute   


Plan: 


Assessment:  Closed displaced right femoral neck fracture is recommended for 

insertion of a cemented unipolar chivo-plasty prosthesis.  Risks and benefits 

were discussed at length with the patient and daughter with all questions 

answered, and they both elected to proceed with an elective insertion of hip 

prosthesis for the right femoral neck fracture.


Plan:  The patient will be npo after midnight, and scheduled to follow an 

arthroscopic procedure on 04/9/2019 at approximately 9:30 to 10:00.


Qualifiers: 


   Encounter type: initial encounter   Fracture type: closed   Laterality: 

right   Qualified Code(s): S72.001A - Fracture of unspecified part of neck of 

right femur, initial encounter for closed fracture

## 2019-04-09 LAB — HCT VFR BLD CALC: 39.3 % (ref 39.6–49)

## 2019-04-09 PROCEDURE — 0SRR0J9 REPLACEMENT OF RIGHT HIP JOINT, FEMORAL SURFACE WITH SYNTHETIC SUBSTITUTE, CEMENTED, OPEN APPROACH: ICD-10-PCS

## 2019-04-09 RX ADMIN — HUMAN INSULIN SCH: 100 INJECTION, SOLUTION SUBCUTANEOUS at 16:30

## 2019-04-09 RX ADMIN — PHENYTOIN SODIUM SCH MG: 100 CAPSULE, EXTENDED RELEASE ORAL at 21:03

## 2019-04-09 RX ADMIN — MORPHINE SULFATE PRN MG: 2 INJECTION, SOLUTION INTRAMUSCULAR; INTRAVENOUS at 16:18

## 2019-04-09 RX ADMIN — CEFAZOLIN SCH MLS: 1 INJECTION, POWDER, FOR SOLUTION INTRAMUSCULAR; INTRAVENOUS; PARENTERAL at 08:55

## 2019-04-09 RX ADMIN — DONEPEZIL HYDROCHLORIDE SCH MG: 5 TABLET ORAL at 21:03

## 2019-04-09 RX ADMIN — SODIUM CHLORIDE SCH MLS: 0.9 INJECTION, SOLUTION INTRAVENOUS at 15:00

## 2019-04-09 RX ADMIN — Medication SCH ML: at 21:04

## 2019-04-09 RX ADMIN — CARBIDOPA AND LEVODOPA SCH TAB: 25; 100 TABLET ORAL at 21:03

## 2019-04-09 RX ADMIN — CARBIDOPA AND LEVODOPA SCH: 25; 100 TABLET ORAL at 13:00

## 2019-04-09 RX ADMIN — CEFAZOLIN SCH MLS: 1 INJECTION, POWDER, FOR SOLUTION INTRAMUSCULAR; INTRAVENOUS; PARENTERAL at 09:25

## 2019-04-09 RX ADMIN — Medication SCH: at 09:00

## 2019-04-09 RX ADMIN — EZETIMIBE SCH: 10 TABLET ORAL at 09:00

## 2019-04-09 RX ADMIN — MORPHINE SULFATE PRN MG: 2 INJECTION, SOLUTION INTRAMUSCULAR; INTRAVENOUS at 21:13

## 2019-04-09 RX ADMIN — LISINOPRIL SCH: 10 TABLET ORAL at 09:00

## 2019-04-09 RX ADMIN — DOCUSATE SODIUM SCH: 100 CAPSULE, LIQUID FILLED ORAL at 09:00

## 2019-04-09 RX ADMIN — KETOROLAC TROMETHAMINE PRN MG: 30 INJECTION, SOLUTION INTRAMUSCULAR at 01:23

## 2019-04-09 RX ADMIN — BUPIVACAINE HYDROCHLORIDE AND EPINEPHRINE BITARTRATE ONE ML: 2.5; .005 INJECTION, SOLUTION INFILTRATION; PERINEURAL at 08:54

## 2019-04-09 RX ADMIN — HUMAN INSULIN SCH: 100 INJECTION, SOLUTION SUBCUTANEOUS at 07:30

## 2019-04-09 RX ADMIN — CARBIDOPA AND LEVODOPA SCH: 25; 100 TABLET ORAL at 09:00

## 2019-04-09 RX ADMIN — MONTELUKAST SODIUM SCH: 10 TABLET, FILM COATED ORAL at 09:00

## 2019-04-09 RX ADMIN — ROSUVASTATIN CALCIUM SCH MG: 10 TABLET, COATED ORAL at 21:03

## 2019-04-09 RX ADMIN — BUPIVACAINE HYDROCHLORIDE AND EPINEPHRINE BITARTRATE ONE ML: 2.5; .005 INJECTION, SOLUTION INFILTRATION; PERINEURAL at 12:45

## 2019-04-09 RX ADMIN — CARBIDOPA AND LEVODOPA SCH TAB: 25; 100 TABLET ORAL at 14:52

## 2019-04-09 RX ADMIN — CEFAZOLIN SCH MLS: 1 INJECTION, POWDER, FOR SOLUTION INTRAMUSCULAR; INTRAVENOUS; PARENTERAL at 21:00

## 2019-04-09 RX ADMIN — HUMAN INSULIN SCH: 100 INJECTION, SOLUTION SUBCUTANEOUS at 21:00

## 2019-04-09 RX ADMIN — HUMAN INSULIN SCH: 100 INJECTION, SOLUTION SUBCUTANEOUS at 11:30

## 2019-04-09 RX ADMIN — SODIUM CHLORIDE SCH: 0.9 INJECTION, SOLUTION INTRAVENOUS at 05:45

## 2019-04-09 NOTE — P.PN
Subjective


Date of Service: 04/09/19


Chief Complaint: Right hip fracture


Subjective: No C/O voiced, NPO, Doing well, Other (Awaiting Surgical Procedure)





Review of Systems


10-point ROS is otherwise unremarkable





Physical Examination





- Vital Signs


Temperature: 98.1 F


Blood Pressure: 178/61


Pulse: 94


Respirations: 16


Pulse Ox (%): 97





- Physical Exam


General: Alert, In no apparent distress


HEENT: Atraumatic, PERRLA, EOMI


Neck: Supple, JVD not distended


Respiratory: Clear to auscultation bilaterally, Normal air movement


Cardiovascular: Regular rate/rhythm, Normal S1 S2


Gastrointestinal: Normal bowel sounds, No tenderness


Musculoskeletal: No tenderness


Integumentary: No rashes


Neurological: Normal speech, Normal tone, Normal affect


Lymphatics: No axilla or inguinal lymphadenopathy





- Studies


Medications List Reviewed: Yes





Assessment And Plan





- Current Problems (Diagnosis)


(1) Hip fracture


Onset Date: ~04/07/19   Current Visit: Yes   Status: Acute   


Plan: 


Hip Fracture s.p Fall 


-Fall precautions. 


-orthopedics Consulted. Reccs Appreciated 


   -OR procedure today 


-NPO since midnight 


-Will F.u post Surgery 


Qualifiers: 


   Encounter type: initial encounter   Fracture type: closed   Laterality: 

right   Qualified Code(s): S72.001A - Fracture of unspecified part of neck of 

right femur, initial encounter for closed fracture   





(2) COPD (chronic obstructive pulmonary disease)


Current Visit: Yes   Status: Chronic   


Qualifiers: 


   COPD type: emphysema   Emphysema type: unspecified   Qualified Code(s): 

J43.9 - Emphysema, unspecified   





(3) Diabetes mellitus


Current Visit: Yes   Status: Chronic   


Qualifiers: 


   Diabetes mellitus type: type 2   Diabetes mellitus long term insulin use: 

without long term use   Diabetes mellitus complication status: with unspecified 

complications   Qualified Code(s): E11.8 - Type 2 diabetes mellitus with 

unspecified complications   





(4) HTN (hypertension)


Current Visit: Yes   Status: Chronic   


Qualifiers: 


   Hypertension type: essential hypertension   Qualified Code(s): I10 - 

Essential (primary) hypertension   





(5) Parkinson disease


Current Visit: Yes   Status: Chronic   





(6) Seizure disorder


Current Visit: Yes   Status: Chronic   





- Plan





Pending Improvement. OR procedure today. F.u post procedure 





Discharge Plan: Other


Plan to discharge in: Greater than 2 days





- Code Status/Comfort Care


Code Status Assessed: Yes


Critical Care: No

## 2019-04-09 NOTE — RAD REPORT
EXAM DESCRIPTION:  RAD - Pelvis - 4/9/2019 1:47 pm

 

CLINICAL HISTORY:  S/P RIGHT UNIPOLAR HIP

 

COMPARISON:  No comparisons

 

FINDINGS:  Right total hip arthroplasty is noted. Skin staples are present laterally.

## 2019-04-09 NOTE — EKG
Test Date:    2019-04-07               Test Time:    21:46:05

Technician:   BROOK                                     

                                                     

MEASUREMENT RESULTS:                                       

Intervals:                                           

Rate:         92                                     

CO:           144                                    

QRSD:         98                                     

QT:           352                                    

QTc:          435                                    

Axis:                                                

P:            51                                     

CO:           144                                    

QRS:          12                                     

T:            63                                     

                                                     

INTERPRETIVE STATEMENTS:                                       

                                                     

Normal sinus rhythm

Low voltage QRS

Borderline ECG

No previous ECG available for comparison



Electronically Signed On 04-08-19 10:49:06 CDT by Jourdan Coffey

## 2019-04-10 LAB — HCT VFR BLD CALC: 36 % (ref 39.6–49)

## 2019-04-10 RX ADMIN — MONTELUKAST SODIUM SCH MG: 10 TABLET, FILM COATED ORAL at 10:00

## 2019-04-10 RX ADMIN — DONEPEZIL HYDROCHLORIDE SCH MG: 5 TABLET ORAL at 20:49

## 2019-04-10 RX ADMIN — HUMAN INSULIN SCH: 100 INJECTION, SOLUTION SUBCUTANEOUS at 16:30

## 2019-04-10 RX ADMIN — MORPHINE SULFATE PRN MG: 2 INJECTION, SOLUTION INTRAMUSCULAR; INTRAVENOUS at 11:39

## 2019-04-10 RX ADMIN — SODIUM CHLORIDE SCH MLS: 0.9 INJECTION, SOLUTION INTRAVENOUS at 00:00

## 2019-04-10 RX ADMIN — HUMAN INSULIN SCH: 100 INJECTION, SOLUTION SUBCUTANEOUS at 11:30

## 2019-04-10 RX ADMIN — PHENYTOIN SODIUM SCH MG: 100 CAPSULE, EXTENDED RELEASE ORAL at 20:48

## 2019-04-10 RX ADMIN — DOCUSATE SODIUM SCH MG: 100 CAPSULE, LIQUID FILLED ORAL at 10:00

## 2019-04-10 RX ADMIN — MORPHINE SULFATE PRN MG: 2 INJECTION, SOLUTION INTRAMUSCULAR; INTRAVENOUS at 03:54

## 2019-04-10 RX ADMIN — HUMAN INSULIN SCH: 100 INJECTION, SOLUTION SUBCUTANEOUS at 21:00

## 2019-04-10 RX ADMIN — Medication SCH: at 09:00

## 2019-04-10 RX ADMIN — CARBIDOPA AND LEVODOPA SCH TAB: 25; 100 TABLET ORAL at 16:56

## 2019-04-10 RX ADMIN — CEFAZOLIN SCH MLS: 1 INJECTION, POWDER, FOR SOLUTION INTRAMUSCULAR; INTRAVENOUS; PARENTERAL at 01:31

## 2019-04-10 RX ADMIN — EZETIMIBE SCH MG: 10 TABLET ORAL at 10:00

## 2019-04-10 RX ADMIN — SODIUM CHLORIDE SCH: 0.9 INJECTION, SOLUTION INTRAVENOUS at 11:00

## 2019-04-10 RX ADMIN — CARBIDOPA AND LEVODOPA SCH TAB: 25; 100 TABLET ORAL at 10:00

## 2019-04-10 RX ADMIN — HUMAN INSULIN SCH: 100 INJECTION, SOLUTION SUBCUTANEOUS at 07:30

## 2019-04-10 RX ADMIN — CARBIDOPA AND LEVODOPA SCH TAB: 25; 100 TABLET ORAL at 13:35

## 2019-04-10 RX ADMIN — SODIUM CHLORIDE SCH MLS: 0.9 INJECTION, SOLUTION INTRAVENOUS at 12:44

## 2019-04-10 RX ADMIN — CARBIDOPA AND LEVODOPA SCH TAB: 25; 100 TABLET ORAL at 20:50

## 2019-04-10 RX ADMIN — Medication SCH ML: at 21:00

## 2019-04-10 RX ADMIN — ROSUVASTATIN CALCIUM SCH MG: 10 TABLET, COATED ORAL at 20:48

## 2019-04-10 RX ADMIN — HYDROCODONE BITARTRATE AND ACETAMINOPHEN PRN TAB: 7.5; 325 TABLET ORAL at 20:49

## 2019-04-10 NOTE — P.PN
Subjective


Date of Service: 04/10/19


Chief Complaint: Right hip fracture


Subjective: No C/O voiced, Tolerating diet, Improving, Working w/ PT, Doing well





Review of Systems


10-point ROS is otherwise unremarkable





Physical Examination





- Vital Signs


Temperature: 98.6 F


Blood Pressure: 125/60


Pulse: 89


Respirations: 24


Pulse Ox (%): 97





- Physical Exam


General: Alert, In no apparent distress


HEENT: Atraumatic, PERRLA, EOMI


Neck: Supple, JVD not distended


Respiratory: Clear to auscultation bilaterally, Normal air movement


Cardiovascular: Regular rate/rhythm, Normal S1 S2


Gastrointestinal: Normal bowel sounds, No tenderness


Musculoskeletal: No tenderness


Integumentary: No rashes


Neurological: Normal speech, Normal tone, Normal affect


Lymphatics: No axilla or inguinal lymphadenopathy





- Studies


Medications List Reviewed: Yes





Assessment And Plan





- Current Problems (Diagnosis)


(1) Hip fracture


Onset Date: ~04/07/19   Current Visit: Yes   Status: Acute   


Plan: 


Hip Fracture s.p Fall 


-Fall precautions. 


-orthopedics Consulted. Reccs Appreciated 


   -S/p ORIF POD 1 


-PT/OT consulted. 


-DVT ppx per ortho reccs








Qualifiers: 


   Encounter type: initial encounter   Fracture type: closed   Laterality: 

right   Qualified Code(s): S72.001A - Fracture of unspecified part of neck of 

right femur, initial encounter for closed fracture   





(2) COPD (chronic obstructive pulmonary disease)


Current Visit: Yes   Status: Chronic   


Qualifiers: 


   COPD type: emphysema   Emphysema type: unspecified   Qualified Code(s): 

J43.9 - Emphysema, unspecified   





(3) Diabetes mellitus


Current Visit: Yes   Status: Chronic   


Qualifiers: 


   Diabetes mellitus type: type 2   Diabetes mellitus long term insulin use: 

without long term use   Diabetes mellitus complication status: with unspecified 

complications   Qualified Code(s): E11.8 - Type 2 diabetes mellitus with 

unspecified complications   





(4) HTN (hypertension)


Current Visit: Yes   Status: Chronic   


Qualifiers: 


   Hypertension type: essential hypertension   Qualified Code(s): I10 - 

Essential (primary) hypertension   





(5) Parkinson disease


Current Visit: Yes   Status: Chronic   





(6) Seizure disorder


Current Visit: Yes   Status: Chronic   





- Plan





Pending Placement now. 





Discharge Plan: Other


Plan to discharge in: Greater than 2 days





- Code Status/Comfort Care


Code Status Assessed: Yes


Critical Care: No

## 2019-04-11 LAB — HCT VFR BLD CALC: 32.2 % (ref 39.6–49)

## 2019-04-11 RX ADMIN — HYDROCODONE BITARTRATE AND ACETAMINOPHEN PRN TAB: 7.5; 325 TABLET ORAL at 21:47

## 2019-04-11 RX ADMIN — HUMAN INSULIN SCH: 100 INJECTION, SOLUTION SUBCUTANEOUS at 11:30

## 2019-04-11 RX ADMIN — DOCUSATE SODIUM SCH MG: 100 CAPSULE, LIQUID FILLED ORAL at 09:27

## 2019-04-11 RX ADMIN — CARBIDOPA AND LEVODOPA SCH TAB: 25; 100 TABLET ORAL at 17:28

## 2019-04-11 RX ADMIN — HUMAN INSULIN SCH: 100 INJECTION, SOLUTION SUBCUTANEOUS at 21:00

## 2019-04-11 RX ADMIN — EZETIMIBE SCH MG: 10 TABLET ORAL at 09:28

## 2019-04-11 RX ADMIN — CARBIDOPA AND LEVODOPA SCH TAB: 25; 100 TABLET ORAL at 14:12

## 2019-04-11 RX ADMIN — CARBIDOPA AND LEVODOPA SCH TAB: 25; 100 TABLET ORAL at 21:48

## 2019-04-11 RX ADMIN — HUMAN INSULIN SCH: 100 INJECTION, SOLUTION SUBCUTANEOUS at 07:30

## 2019-04-11 RX ADMIN — HUMAN INSULIN SCH: 100 INJECTION, SOLUTION SUBCUTANEOUS at 16:30

## 2019-04-11 RX ADMIN — PHENYTOIN SODIUM SCH MG: 100 CAPSULE, EXTENDED RELEASE ORAL at 21:48

## 2019-04-11 RX ADMIN — DONEPEZIL HYDROCHLORIDE SCH MG: 5 TABLET ORAL at 21:47

## 2019-04-11 RX ADMIN — CEFTRIAXONE SCH MLS: 1 INJECTION, SOLUTION INTRAVENOUS at 10:26

## 2019-04-11 RX ADMIN — Medication SCH ML: at 09:28

## 2019-04-11 RX ADMIN — Medication SCH ML: at 21:47

## 2019-04-11 RX ADMIN — ROSUVASTATIN CALCIUM SCH MG: 10 TABLET, COATED ORAL at 21:47

## 2019-04-11 RX ADMIN — MONTELUKAST SODIUM SCH MG: 10 TABLET, FILM COATED ORAL at 09:26

## 2019-04-11 RX ADMIN — HYDROCODONE BITARTRATE AND ACETAMINOPHEN PRN TAB: 7.5; 325 TABLET ORAL at 14:15

## 2019-04-11 RX ADMIN — CARBIDOPA AND LEVODOPA SCH TAB: 25; 100 TABLET ORAL at 09:27

## 2019-04-11 RX ADMIN — HYDROCODONE BITARTRATE AND ACETAMINOPHEN PRN TAB: 7.5; 325 TABLET ORAL at 09:26

## 2019-04-11 NOTE — P.PN
Date of Service: 04/10/19 (POD#1)





S:  PATIENT RESPONDS VERBALLY, ORIENTED X 3, FOLLOWS INSTRUCTIONS DURING EXAM. 


O:  AFEBRILE, VSS, HGB 11.8 THIS AM, WAS 12.8 POST OP YESTERDAY. BANDAGE CLEAN 

DRY AND INTACT. PATIENT DEMONSTRATES GOOD MOTION AND STRENGTH FOR RIGHT ANKLE 

AND FOOT. X-RAY REVIEWED, GOOD POSITION AND LENGTH FOR HEMIPLASTY. AM ENCOUNTER 

WITH PT WITH MINIMAL MOBILIZATION DUE TO STIFFNESS AND FAILURE TO ATTEMPT 

INSTRUCTIONS. PM ENCOUNTER LIMITED BY SLEEPINESS AND REFUSAL OF PARTICIPATION.


A:  POOR PROGRESS DAY 1 POST OP. 


P:  PATIENT ENCOURAGED TO MAKE EFFORT TO PARTICIPATE. PARKINSONISM MAY BE 

REASON FOR STIFFNESS AND DIFFICULTY INITIATING EFFORT TO STAND.

## 2019-04-11 NOTE — RAD REPORT
EXAM DESCRIPTION:  US - Urinary Bladder - 4/11/2019 8:09 pm

 

CLINICAL HISTORY:  Hematuria

 

FINDINGS:  Prevoid bladder volume equals 61 cc

 

Postvoid bladder volume equals 22 cc

 

A Thomas catheter is present within the bladder.

 

No ascites seen

 

IMPRESSION:  Prevoid bladder volume equals 61 cc

 

Postvoid bladder volume equals 22 cc

.

## 2019-04-11 NOTE — RAD REPORT
EXAM DESCRIPTION:  US - Renal Ultrasound-Complete - 4/11/2019 8:07 pm

 

CLINICAL HISTORY:  . Hematuria

 

COMPARISON:  None.

 

FINDINGS:  The right kidney measures 12 cm with an increased echotexture.

 

The left kidney measures 12 cm with an increased echotexture. 1 centimeter left renal cyst

 

Hydronephrosis is not seen.

 

IMPRESSION:  Increased renal echotexture consistent with parenchymal disease

## 2019-04-11 NOTE — P.PN
Subjective


Date of Service: 04/11/19


Chief Complaint: Right hip fracture


Subjective: No C/O voiced, Ambulating, Improving, Working w/ PT, Doing well, 

Other (Did start having hematuria)





Review of Systems


10-point ROS is otherwise unremarkable





Physical Examination





- Vital Signs


Temperature: 98.5 F


Blood Pressure: 120/57


Pulse: 79


Respirations: 16


Pulse Ox (%): 98





- Physical Exam


General: Alert, In no apparent distress


HEENT: Atraumatic, PERRLA, EOMI


Neck: Supple, JVD not distended


Respiratory: Normal air movement, Rhonchi/gurgles


Cardiovascular: Regular rate/rhythm, Normal S1 S2


Gastrointestinal: Normal bowel sounds, No tenderness


Musculoskeletal: No tenderness


Integumentary: No rashes


Neurological: Normal speech, Normal tone, Normal affect


Lymphatics: No axilla or inguinal lymphadenopathy


Urinary: Thomas catheter (Hematuria )





- Studies


Medications List Reviewed: Yes





Assessment And Plan





- Current Problems (Diagnosis)


(1) Hematuria


Current Visit: Yes   Status: Acute   


Plan: 


Gross Hematuria most likely 2.2 to UTI vs BPH vs mass


-Urology consulted. Appreciated Reccs


-Bladder irrigation and started on IV rocephin 





Qualifiers: 


   Hematuria type: gross   Qualified Code(s): R31.0 - Gross hematuria   





(2) Hip fracture


Onset Date: ~04/07/19   Current Visit: Yes   Status: Acute   


Plan: 


Hip Fracture s.p Fall 


-Fall precautions. 


-orthopedics Consulted. Reccs Appreciated 


   -S/p ORIF POD 2 


-PT/OT consulted. 


-DVT ppx per ortho reccs








Qualifiers: 


   Encounter type: initial encounter   Fracture type: closed   Laterality: 

right   Qualified Code(s): S72.001A - Fracture of unspecified part of neck of 

right femur, initial encounter for closed fracture   





(3) COPD (chronic obstructive pulmonary disease)


Current Visit: Yes   Status: Chronic   


Qualifiers: 


   COPD type: emphysema   Emphysema type: unspecified   Qualified Code(s): 

J43.9 - Emphysema, unspecified   





(4) Diabetes mellitus


Current Visit: Yes   Status: Chronic   


Qualifiers: 


   Diabetes mellitus type: type 2   Diabetes mellitus long term insulin use: 

without long term use   Diabetes mellitus complication status: with unspecified 

complications   Qualified Code(s): E11.8 - Type 2 diabetes mellitus with 

unspecified complications   





(5) HTN (hypertension)


Current Visit: Yes   Status: Chronic   


Qualifiers: 


   Hypertension type: essential hypertension   Qualified Code(s): I10 - 

Essential (primary) hypertension   





(6) Parkinson disease


Current Visit: Yes   Status: Chronic   





(7) Seizure disorder


Current Visit: Yes   Status: Chronic

## 2019-04-11 NOTE — CON
History Of Present Illness:  A 78-year-old gentleman with history of COPD, 
hypertension, dyslipidemia, seizure disorder, diabetes type 2, who fell and 
broke his right hip.  The patient is now status post surgical repair done on 04/
09/2019 by Dr. Johnny Lucas.  The patient had a closed displaced right femoral 
neck fracture and had the procedure done of insertion of unipolar cemented 
hemiarthroplasty for right hip fracture.



Past Medical History:  COPD, hypertension, Parkinson disease, dyslipidemia, 
diabetes type 2, seizures, appendectomy.



Family History:  Noncontributory.



Social History:  Former smoker.  No drug use.  Resides at home.



Review of Systems:

Ten points, otherwise unremarkable.



Physical Examination:

General:  The patient is awake, afebrile, and stable.  No acute distress. 

HEENT:  Atraumatic and normocephalic. 

Respiratory:  Clear. 

Cardiovascular:  S1, S2. 

Gastrointestinal:  Soft. 

Musculoskeletal:  Mild tenderness. 

Skin:  No rashes. 

Neurologic:  Intact.



Laboratory Data:  Reviewed PTT/INR.  H and H are 10 and 32, down from 12 and 39 
on admission.  Urine showed 3+ blood, nitrite negative, esterase 2+, RBCs loaded
, white count 10 to 20, bacteria 20 to 50.  Microbiology showing mixed tripp so 
far.



Assessment/plan:  The patient had gross hematuria with catheter.  On manual 
irrigation by the nurse it immediately cleared.  Seems to be Thomas trauma 
related.  The patient is on IV Rocephin that will cover the urinary tract 
infection if possible, although I am not convinced it is urinary tract infection
, seems to be more of Thomas trauma.  Dr. Morillo has ordered a bladder ultrasound 
to check for masses, which is fine.  We will see what that shows.





CHARLOTTE/MODL

DD:  04/11/2019 12:53:35   Voice ID:  [id number]

DT:  04/11/2019 19:41:50   Report ID:  [id number]

KUSHAL

## 2019-04-12 LAB — HCT VFR BLD CALC: 31.2 % (ref 39.6–49)

## 2019-04-12 RX ADMIN — Medication SCH ML: at 09:54

## 2019-04-12 RX ADMIN — HYDROCODONE BITARTRATE AND ACETAMINOPHEN PRN TAB: 7.5; 325 TABLET ORAL at 00:16

## 2019-04-12 RX ADMIN — ROSUVASTATIN CALCIUM SCH MG: 10 TABLET, COATED ORAL at 21:21

## 2019-04-12 RX ADMIN — HUMAN INSULIN SCH: 100 INJECTION, SOLUTION SUBCUTANEOUS at 16:30

## 2019-04-12 RX ADMIN — ENOXAPARIN SODIUM SCH MG: 40 INJECTION SUBCUTANEOUS at 17:08

## 2019-04-12 RX ADMIN — PANTOPRAZOLE SODIUM SCH MG: 40 TABLET, DELAYED RELEASE ORAL at 09:52

## 2019-04-12 RX ADMIN — HUMAN INSULIN SCH: 100 INJECTION, SOLUTION SUBCUTANEOUS at 07:30

## 2019-04-12 RX ADMIN — DONEPEZIL HYDROCHLORIDE SCH MG: 5 TABLET ORAL at 21:22

## 2019-04-12 RX ADMIN — EZETIMIBE SCH MG: 10 TABLET ORAL at 09:52

## 2019-04-12 RX ADMIN — HYDROCODONE BITARTRATE AND ACETAMINOPHEN PRN TAB: 7.5; 325 TABLET ORAL at 21:21

## 2019-04-12 RX ADMIN — CARBIDOPA AND LEVODOPA SCH TAB: 25; 100 TABLET ORAL at 21:21

## 2019-04-12 RX ADMIN — PHENYTOIN SODIUM SCH MG: 100 CAPSULE, EXTENDED RELEASE ORAL at 21:22

## 2019-04-12 RX ADMIN — TEMAZEPAM PRN MG: 15 CAPSULE ORAL at 23:55

## 2019-04-12 RX ADMIN — Medication SCH ML: at 21:23

## 2019-04-12 RX ADMIN — DOCUSATE SODIUM SCH MG: 100 CAPSULE, LIQUID FILLED ORAL at 09:52

## 2019-04-12 RX ADMIN — CARBIDOPA AND LEVODOPA SCH TAB: 25; 100 TABLET ORAL at 17:08

## 2019-04-12 RX ADMIN — MONTELUKAST SODIUM SCH MG: 10 TABLET, FILM COATED ORAL at 09:54

## 2019-04-12 RX ADMIN — CARBIDOPA AND LEVODOPA SCH TAB: 25; 100 TABLET ORAL at 09:56

## 2019-04-12 RX ADMIN — HUMAN INSULIN SCH: 100 INJECTION, SOLUTION SUBCUTANEOUS at 21:00

## 2019-04-12 RX ADMIN — CEFTRIAXONE SCH MLS: 1 INJECTION, SOLUTION INTRAVENOUS at 09:52

## 2019-04-12 RX ADMIN — HUMAN INSULIN SCH: 100 INJECTION, SOLUTION SUBCUTANEOUS at 11:30

## 2019-04-12 RX ADMIN — CARBIDOPA AND LEVODOPA SCH TAB: 25; 100 TABLET ORAL at 14:19

## 2019-04-12 RX ADMIN — ENOXAPARIN SODIUM SCH MG: 40 INJECTION SUBCUTANEOUS at 21:21

## 2019-04-12 NOTE — P.PN
Date of Service: 04/12/19 (POD#3)





S:  PATIENT IN BED INDICATES GOOD PT EFFORT THIS AM. ALSO LOOKING FOR DOG UNDER 

THE COVERS. FOLLOWS INSTRUCTIONS DURING EXAM AND MAKES REASONABLE 

CONVERSATIONAL RESPONSES, BUT FREQUENTLY BLOCKED, UNABLE TO FIND THE RIGHT 

WORD. 


O:  AFEBRILE, VSS, HGB 10.6 THIS AM, WAS 11.5 YESTERDAY. BANDAGE CLEAN DRY AND 

INTACT. PATIENT DEMONSTRATES GOOD MOTION AND STRENGTH FOR RIGHT ANKLE AND FOOT. 

NO PT NOTES YET AVAILABLE FOR REVIEW TODAY. HELPED PATIENT GET ACCESS TO [facility] 

FOR LUNCH.


A:  POST OP DAY #3.  [facility] APPLICATION PENDING.


P:  CONTINUE MOBILIZATION


     WOULD USE ANTICOAGULATION FOR 30 D POST OP, USUALLY XARELTO 10 MG P.O. 

DAILY IF CLEARED WITH DR. DAVIS. PATIENT HAD BLOOD IN URINE POSSIBLY DUE TO 

VOGT TRAUMA.


     CHANGE AQUACEL BANDAGE PRIOR TO DISCHARGE WITH ALCOHOL SWABS AND NEW 

AQUACEL BANDAGE.


     F/U MY OFFICE ~10D POST DISCHARGE.

## 2019-04-12 NOTE — P.PN
Date of Service: 04/11/19 (POD#2)





S:  PATIENT IN BED FINISHING EVENING MEAL, ORIENTED X 3, FOLLOWS INSTRUCTIONS 

DURING EXAM. 


O:  AFEBRILE, VSS, HGB 10.5 THIS AM, WAS 11.8 YESTERDAY. BANDAGE CLEAN DRY AND 

INTACT. PATIENT DEMONSTRATES GOOD MOTION AND STRENGTH FOR RIGHT ANKLE AND FOOT. 

ONE ENCOUNTER WITH PT DOCUMENTED TODAY.STOOD FROM ELEVATED BED TWICE, 2 MIN. 

STANDING, THEN SIDE STEPS. MAXIMUM ASSIST TO STAND FROM SITTING. 


A:  SLOW PROGRESS DAY 2 POST OP. FAMILY HAS INDICATED PATIENT UNLIKELY TO 

HANDLE 3 HOURS PER DAY OF PHYSICAL THERAPY.  THEY HAVE SELECTED [facility] 

SNF


P:  PATIENT AGAIN ENCOURAGED TO MAKE EFFORT TO PARTICIPATE. PARKINSONISM IS AN 

ISSUE.  MOTIVATION IS VOCAL, NOT EVIDENT.

## 2019-04-12 NOTE — P.PN
Subjective


Date of Service: 04/12/19


Chief Complaint: Right hip fracture


Subjective: Tolerating diet, Ambulating, Improving, Working w/ PT, Doing well, 

Other (Hematuria Resolved today. Denies Fever, Chills, SOB or CP at this time)





Review of Systems


10-point ROS is otherwise unremarkable





Physical Examination





- Vital Signs


Temperature: 98.5 F


Blood Pressure: 141/68


Pulse: 84


Respirations: 18


Pulse Ox (%): 99





- Physical Exam


General: Alert, In no apparent distress


HEENT: Atraumatic, PERRLA, EOMI


Neck: Supple, JVD not distended


Respiratory: Clear to auscultation bilaterally, Normal air movement


Cardiovascular: Regular rate/rhythm, Normal S1 S2


Gastrointestinal: Normal bowel sounds, No tenderness


Musculoskeletal: No tenderness


Integumentary: No rashes


Neurological: Normal speech, Normal tone, Normal affect


Lymphatics: No axilla or inguinal lymphadenopathy





- Studies


Medications List Reviewed: Yes





Assessment And Plan





- Current Problems (Diagnosis)


(1) Hematuria


Current Visit: Yes   Status: Acute   


Plan: 


Gross Hematuria most likely 2.2 to UTI vs Trauma from Thomas Insertion. Now 

Resolved. 


-Urology consulted. Appreciated Reccs


-S/P Bladder irrigation now 








Qualifiers: 


   Hematuria type: gross   Qualified Code(s): R31.0 - Gross hematuria   





(2) Hip fracture


Onset Date: ~04/07/19   Current Visit: Yes   Status: Acute   


Plan: 


Hip Fracture s.p Fall 


-Fall precautions. 


-orthopedics Consulted. Reccs Appreciated 


   -S/p ORIF POD 3


-PT/OT consulted. 


-DVT ppx with lovenox 40mg BID


Qualifiers: 


   Encounter type: initial encounter   Fracture type: closed   Laterality: 

right   Qualified Code(s): S72.001A - Fracture of unspecified part of neck of 

right femur, initial encounter for closed fracture   





(3) COPD (chronic obstructive pulmonary disease)


Current Visit: Yes   Status: Chronic   


Qualifiers: 


   COPD type: emphysema   Emphysema type: unspecified   Qualified Code(s): 

J43.9 - Emphysema, unspecified   





(4) Diabetes mellitus


Current Visit: Yes   Status: Chronic   


Qualifiers: 


   Diabetes mellitus type: type 2   Diabetes mellitus long term insulin use: 

without long term use   Diabetes mellitus complication status: with unspecified 

complications   Qualified Code(s): E11.8 - Type 2 diabetes mellitus with 

unspecified complications   





(5) HTN (hypertension)


Current Visit: Yes   Status: Chronic   


Qualifiers: 


   Hypertension type: essential hypertension   Qualified Code(s): I10 - 

Essential (primary) hypertension   





(6) Parkinson disease


Current Visit: Yes   Status: Chronic   





(7) Seizure disorder


Current Visit: Yes   Status: Chronic   





- Plan





Pending Placement now to SNF for PT/OT. Started on Lovenox for DVT PPX post Hip 

surgery. no Xarelto or eliquis due to being on Dilantin 








Discharge Plan: Other


Plan to discharge in: Greater than 2 days





- Code Status/Comfort Care


Code Status Assessed: Yes


Critical Care: No

## 2019-04-12 NOTE — OP
Date of Procedure:  04/09/2019



Surgeon:  Johnny Lucas MD



Assistant:  CHARLENE Cobb, who gave very necessary first assist services throughout this case.



Preoperative Diagnosis:  Closed, displaced right femoral neck fracture.



Postoperative Diagnosis:  Closed, displaced right femoral neck fracture.



Primary Procedure:  Insertion of unipolar cemented hemiarthroplasty for right hip fracture, femoral n
jacquelyn.



Indication:  This is a 78-year-old  male who was walking in the dark and forgot about the 3 
steps down at the end of the fair.  He stepped off into the air and tumbled down those steps and suff
ered a fracture of the right femoral neck.  After discussion of risks and benefits with all questions
 answered, the patient and his family have elected to proceed with insertion of cemented unipolar rig
ht hip prosthesis for his femoral neck displaced fracture.  The patient was taken to surgery and had 
a [place] size 5 stem cemented in place, and a 54 mm head ball with 12/14 taper neck at -3 mm tapped i
nto place.  The stem had a centralizer size 10.5 mm and 2 batches of Simplex HV cement with gentamici
n were placed in a cement gun for injection technique.



Technique:  The patient was taken to the operating room and given a general anesthesia with intubatio
n while in his hospital bed.  The patient was then moved to the operating table and moved on to his l
eft side with bolsters applied to lumbar, abdominal, and pubic ramus areas to maintain the left later
al position.  The right lower extremity was placed in traction to an IV stand and the Betadine scrub 
and prep were applied from ribcage to ankle.  DuraPrep stick was used for prepping the ankle and foot
.  Sterile U-drapes and appropriate drapes were applied.  Gloves were changed.  Then the incision was
 drawn on the proximal femoral area curving toward the posterior superior iliac spine for the posteri
or approach.  IO band sticky drape was then applied.  The incision was made through skin and subcutan
eous tissue sharply with Bovie coagulation.  Then, the cutting Bovie was used to incise the fascia la
ta, which was divided in the lower 2/3 of the incision with the upper portion using sharp and blunt d
issection for the gluteus anny muscle.  Self-retaining retainers were placed in position to mainta
in separation for the fascia dereck incision and the gluteus anny incision.  With that exposure, the
 gemelli external rotators were taken down and tagged, as was the upper portion of the quadratus musc
le.  The capsular incision was carried from the posterior femur superiorly to reach the piriformis an
d then the angle of the capsular incision was changed 90 degrees to progress towards the acetabular r
im superiorly.  The capsule was also tagged for repair as was the piriformis.  The exposed fracture w
as in the midportion of the cervical neck, quite angulated and jammed, and the oscillating saw was us
ed along with the calcar cutting guide to make an appropriate cut for the calcar at its base.  Then t
he rongeur was used to remove shards of calcar to free up motion for the femoral head.  The femoral h
ead was skewered with the corkscrew instrument.  Once it was spinning, the scoop was placed behind th
e femoral head and leverage allowed its removal.  The femoral head was then measured at 54 mm in diam
eter.  The femoral trial of that size was a good fit.  The calcar retractor was placed underneath the
 proximal femur and preparation of the femur was initiated with the initiator on T-handle, followed b
y the canal finder and then the lateralizer was used to make the appropriate alignment available for 
insertion of the stem.  Reamers were used, 1, 2, 3, 4, and 5.  The broaches were then utilized tappin
g them in place in the same sequence.  The size 5 broach in place was used for trialing and the -3 mm
 neck was chosen along with the 54 mm head ball.  This trial reduction was quite stable.  The hip was
 then re-dislocated and trial components were removed.  An intramedullary plug was placed at the appr
opriate depth in the intramedullary canal and Simpulse lavage was carried out followed by drying with
 suction and then a wick inserted into the intramedullary canal.  Cement was mixed in a cement gun us
ing 2 batches of Simplex HV cement with gentamicin.  Injection technique was utilized to instill ceme
nt, which was pressurized with the cement gun, and then the stem was tapped into position and held th
ere during 18 minutes of setting for the cement.  The 54 mm head ball and -3 mm taper were assembled 
and tapped into position, and reduction was successful.  The hip was then assessed and found to be qu
ite stable even with flexion greater than 90 degrees with extreme internal rotation.  Range of motion
 was quite adequate.  Simpulse lavage was utilized in the depths of the incision and then closure was
 initiated with #1 Vicryl interrupted sutures repairing the posterior capsule.  The piriformis insert
ion was made through the gluteus insertion on the greater trochanter posteriorly.  External rotators 
were secured to the vastus lateralis posterior margin with interrupted sutures of #1 Vicryl.  The sergio
dratus was also repaired to the posterior border of the vastus lateralis.  Irrigation was carried out
 again, and then the fascia dereck closure was initiated distally progressing proximally to include the
 gluteus anny fascial component with interrupted #1 Vicryl sutures.  Again, Simpulse lavage was ca
rried out and closure of deep subcutaneous tissue was with interrupted #1 Vicryl with superficial sub
cutaneous closure using 2-0 Vicryl accomplished.  Skin staples were used for skin closure and an Aqua
juan bandage was applied after the incision was injected with 30 mL of 0.25% Marcaine with epinephrine
.  Estimated blood loss was 250 mL.  The patient was taken to the recovery room having tolerated this
 procedure well.





LUMA/BETTY

DD:  04/12/2019 11:41:37Voice ID:  [id number]

DT:  04/12/2019 21:26:12Report ID:  [id number]

## 2019-04-12 NOTE — PN
Subjective:  The patient is doing well.



Objective:  Urine is clear.  Bladder ultrasound is negative.



Plan:  Discontinue Thomas catheter and continue management.  Urology will be 
signing off at this point.



Please feel free to reconsult prn. Thanks.



LISA

DD:  04/12/2019 11:25:30   Voice ID:  [id number]

DT:  04/12/2019 16:31:14   Report ID:  [id number]

MTDD

## 2019-04-13 LAB — HCT VFR BLD CALC: 35.8 % (ref 39.6–49)

## 2019-04-13 RX ADMIN — HUMAN INSULIN SCH: 100 INJECTION, SOLUTION SUBCUTANEOUS at 11:30

## 2019-04-13 RX ADMIN — HUMAN INSULIN SCH: 100 INJECTION, SOLUTION SUBCUTANEOUS at 07:30

## 2019-04-13 RX ADMIN — CARBIDOPA AND LEVODOPA SCH TAB: 25; 100 TABLET ORAL at 10:23

## 2019-04-13 RX ADMIN — PHENYTOIN SODIUM SCH MG: 100 CAPSULE, EXTENDED RELEASE ORAL at 20:51

## 2019-04-13 RX ADMIN — HUMAN INSULIN SCH: 100 INJECTION, SOLUTION SUBCUTANEOUS at 16:30

## 2019-04-13 RX ADMIN — DOCUSATE SODIUM SCH MG: 100 CAPSULE, LIQUID FILLED ORAL at 10:23

## 2019-04-13 RX ADMIN — MONTELUKAST SODIUM SCH MG: 10 TABLET, FILM COATED ORAL at 10:23

## 2019-04-13 RX ADMIN — Medication SCH ML: at 20:51

## 2019-04-13 RX ADMIN — PANTOPRAZOLE SODIUM SCH MG: 40 TABLET, DELAYED RELEASE ORAL at 05:30

## 2019-04-13 RX ADMIN — DONEPEZIL HYDROCHLORIDE SCH MG: 5 TABLET ORAL at 20:51

## 2019-04-13 RX ADMIN — ROSUVASTATIN CALCIUM SCH MG: 10 TABLET, COATED ORAL at 20:50

## 2019-04-13 RX ADMIN — HYDROCODONE BITARTRATE AND ACETAMINOPHEN PRN TAB: 7.5; 325 TABLET ORAL at 10:42

## 2019-04-13 RX ADMIN — CARBIDOPA AND LEVODOPA SCH TAB: 25; 100 TABLET ORAL at 20:50

## 2019-04-13 RX ADMIN — EZETIMIBE SCH MG: 10 TABLET ORAL at 10:23

## 2019-04-13 RX ADMIN — Medication SCH ML: at 10:24

## 2019-04-13 RX ADMIN — CARBIDOPA AND LEVODOPA SCH TAB: 25; 100 TABLET ORAL at 17:31

## 2019-04-13 RX ADMIN — HUMAN INSULIN SCH: 100 INJECTION, SOLUTION SUBCUTANEOUS at 21:00

## 2019-04-13 RX ADMIN — CEFTRIAXONE SCH MLS: 1 INJECTION, SOLUTION INTRAVENOUS at 12:38

## 2019-04-13 RX ADMIN — ENOXAPARIN SODIUM SCH MG: 40 INJECTION SUBCUTANEOUS at 10:23

## 2019-04-13 RX ADMIN — CARBIDOPA AND LEVODOPA SCH TAB: 25; 100 TABLET ORAL at 12:38

## 2019-04-13 NOTE — P.PN
Subjective


Date of Service: 04/13/19


Chief Complaint: Right hip fracture


Subjective: Tolerating diet, Improving, C/O voiced (Increased of Coughing, and 

weakness today.), Working w/ PT, Demented





Review of Systems


10-point ROS is otherwise unremarkable





Physical Examination





- Vital Signs


Temperature: 98.4 F


Blood Pressure: 147/71


Pulse: 85


Respirations: 18


Pulse Ox (%): 97





- Physical Exam


General: Alert, Mild distress


Neck: JVD distended


Respiratory: Normal air movement, Expiratory wheezes, Inspiratory wheezes


Cardiovascular: Regular rate/rhythm, Normal S1 S2


Gastrointestinal: Normal bowel sounds, No tenderness


Musculoskeletal: No tenderness


Integumentary: No rashes


Neurological: Normal speech, Normal tone, Normal affect


Lymphatics: No axilla or inguinal lymphadenopathy





- Studies


Medications List Reviewed: Yes





Assessment And Plan





- Current Problems (Diagnosis)


(1) Hematuria


Current Visit: Yes   Status: Resolved   


Plan: 


Gross Hematuria most likely 2.2 to UTI vs Trauma from Thomas Insertion. Now 

Resolved. 


-Urology consulted. Appreciated Reccs


-S/P Bladder irrigation now 


-urine Culture negative - DC abx now 











Qualifiers: 


   Hematuria type: gross   Qualified Code(s): R31.0 - Gross hematuria   





(2) Hip fracture


Onset Date: ~04/07/19   Current Visit: Yes   Status: Acute   


Plan: 


Hip Fracture s.p Fall 


-Fall precautions. 


-orthopedics Consulted. Reccs Appreciated 


   -S/p ORIF POD 4


-PT/OT consulted. 


-DVT ppx with lovenox 40mg BID


Qualifiers: 


   Encounter type: initial encounter   Fracture type: closed   Laterality: 

right   Qualified Code(s): S72.001A - Fracture of unspecified part of neck of 

right femur, initial encounter for closed fracture   





(3) COPD (chronic obstructive pulmonary disease)


Current Visit: Yes   Status: Chronic   


Qualifiers: 


   COPD type: emphysema   Emphysema type: unspecified   Qualified Code(s): 

J43.9 - Emphysema, unspecified   





(4) Diabetes mellitus


Current Visit: Yes   Status: Chronic   


Qualifiers: 


   Diabetes mellitus type: type 2   Diabetes mellitus long term insulin use: 

without long term use   Diabetes mellitus complication status: with unspecified 

complications   Qualified Code(s): E11.8 - Type 2 diabetes mellitus with 

unspecified complications   





(5) HTN (hypertension)


Current Visit: Yes   Status: Chronic   


Qualifiers: 


   Hypertension type: essential hypertension   Qualified Code(s): I10 - 

Essential (primary) hypertension   





(6) Parkinson disease


Current Visit: Yes   Status: Chronic   





(7) Seizure disorder


Current Visit: Yes   Status: Chronic   





- Plan





Pending Placement now to SNF for PT/OT. Started on Lovenox for DVT PPX post Hip 

surgery. no Xarelto or eliquis due to being on Dilantin. DC abx urine culture 

negative. 








Discharge Plan: Other


Plan to discharge in: Greater than 2 days





- Code Status/Comfort Care


Code Status Assessed: Yes


Critical Care: No

## 2019-04-13 NOTE — RAD REPORT
EXAM DESCRIPTION:  [place] Single View4/13/2019 7:09 pm

 

CLINICAL HISTORY:  Chest pain

 

COMPARISON:  April 7, 2019

 

FINDINGS:  The lungs are hyperaerated.  The lungs appear clear of acute infiltrate. The heart is norm
al size

 

IMPRESSION:   No acute abnormalities displayed

## 2019-04-14 LAB
ALBUMIN SERPL BCP-MCNC: 2.5 G/DL (ref 3.4–5)
ALP SERPL-CCNC: 98 U/L (ref 45–117)
ALT SERPL W P-5'-P-CCNC: 10 U/L (ref 12–78)
AST SERPL W P-5'-P-CCNC: 18 U/L (ref 15–37)
BUN BLD-MCNC: 16 MG/DL (ref 7–18)
GLUCOSE SERPLBLD-MCNC: 121 MG/DL (ref 74–106)
HCT VFR BLD CALC: 34.2 % (ref 39.6–49)
LYMPHOCYTES # SPEC AUTO: 0.7 K/UL (ref 0.7–4.9)
MORPHOLOGY BLD-IMP: (no result)
PMV BLD: 7.5 FL (ref 7.6–11.3)
POTASSIUM SERPL-SCNC: 3.5 MMOL/L (ref 3.5–5.1)
RBC # BLD: 3.94 M/UL (ref 4.33–5.43)

## 2019-04-14 RX ADMIN — DOCUSATE SODIUM SCH MG: 100 CAPSULE, LIQUID FILLED ORAL at 09:38

## 2019-04-14 RX ADMIN — HUMAN INSULIN SCH: 100 INJECTION, SOLUTION SUBCUTANEOUS at 07:30

## 2019-04-14 RX ADMIN — HUMAN INSULIN SCH: 100 INJECTION, SOLUTION SUBCUTANEOUS at 11:30

## 2019-04-14 RX ADMIN — PHENYTOIN SODIUM SCH MG: 100 CAPSULE, EXTENDED RELEASE ORAL at 21:40

## 2019-04-14 RX ADMIN — TEMAZEPAM PRN MG: 15 CAPSULE ORAL at 21:41

## 2019-04-14 RX ADMIN — HUMAN INSULIN SCH: 100 INJECTION, SOLUTION SUBCUTANEOUS at 16:30

## 2019-04-14 RX ADMIN — EZETIMIBE SCH MG: 10 TABLET ORAL at 09:38

## 2019-04-14 RX ADMIN — Medication SCH ML: at 09:39

## 2019-04-14 RX ADMIN — MONTELUKAST SODIUM SCH MG: 10 TABLET, FILM COATED ORAL at 09:38

## 2019-04-14 RX ADMIN — HYDROCODONE BITARTRATE AND ACETAMINOPHEN PRN TAB: 7.5; 325 TABLET ORAL at 09:39

## 2019-04-14 RX ADMIN — CARBIDOPA AND LEVODOPA SCH TAB: 25; 100 TABLET ORAL at 09:39

## 2019-04-14 RX ADMIN — HUMAN INSULIN SCH: 100 INJECTION, SOLUTION SUBCUTANEOUS at 21:00

## 2019-04-14 RX ADMIN — CEFTRIAXONE SCH MLS: 1 INJECTION, SOLUTION INTRAVENOUS at 09:39

## 2019-04-14 RX ADMIN — CARBIDOPA AND LEVODOPA SCH TAB: 25; 100 TABLET ORAL at 21:41

## 2019-04-14 RX ADMIN — CARBIDOPA AND LEVODOPA SCH TAB: 25; 100 TABLET ORAL at 16:51

## 2019-04-14 RX ADMIN — ROSUVASTATIN CALCIUM SCH MG: 10 TABLET, COATED ORAL at 21:40

## 2019-04-14 RX ADMIN — PANTOPRAZOLE SODIUM SCH MG: 40 TABLET, DELAYED RELEASE ORAL at 05:32

## 2019-04-14 RX ADMIN — DONEPEZIL HYDROCHLORIDE SCH MG: 5 TABLET ORAL at 21:40

## 2019-04-14 RX ADMIN — CARBIDOPA AND LEVODOPA SCH TAB: 25; 100 TABLET ORAL at 13:59

## 2019-04-14 RX ADMIN — HYDROCODONE BITARTRATE AND ACETAMINOPHEN PRN TAB: 7.5; 325 TABLET ORAL at 03:25

## 2019-04-14 RX ADMIN — Medication SCH ML: at 21:41

## 2019-04-14 NOTE — PN
Subjective:  Patient is doing well this morning.  He has some other gross 
hematuria yesterday, most likely due to the Lovenox.  I have reviewed his 
kidney ultrasound and bladder ultrasound.  No significant finding that would 
explain the bleeding. 



My assessment would be to do a flexible cystoscopy since patient already had 
hip pinned from surgery and I am not able to do a lithotomy position but a 
flexible cystoscopy would show me the penile urethra, prostate, and bladder to 
identify the source of the bleeding, which is most likely prostate.  He said he 
has never had any previous prostate, bladder, or kidney troubles in the past.  
So, we will make him n.p.o. after midnight and schedule procedure approximately
  around mid day tomorrow. 



Thank you very much.





LISA

DD:  04/14/2019 10:34:48   Voice ID:  [id number]

DT:  04/14/2019 15:28:22   Report ID:  [id number]

KUSHAL

## 2019-04-14 NOTE — P.PN
Subjective


Date of Service: 04/14/19


Chief Complaint: Right hip fracture





Patient seen and examined at bedside with RN.  Chart reviewed.  This morning 

patient is much better than before.  No more troubling her stridor heard.  

Patient did however have hematuria yesterday after starting of Lovenox.  

Lovenox held.  Patient with Thomas catheter now.  Planning for cystoscopy 

tomorrow.





Review of Systems


10-point ROS is otherwise unremarkable





Physical Examination





- Vital Signs


Temperature: 97.3 F


Blood Pressure: 113/50


Pulse: 75


Respirations: 18


Pulse Ox (%): 98





- Physical Exam


General: Alert, In no apparent distress


Respiratory: Normal air movement, Expiratory wheezes, Inspiratory wheezes


Cardiovascular: Regular rate/rhythm, Normal S1 S2


Gastrointestinal: Normal bowel sounds, No tenderness


Musculoskeletal: No tenderness


Integumentary: No rashes


Neurological: Normal speech, Normal tone, Normal affect


Lymphatics: No axilla or inguinal lymphadenopathy


Urinary: Thomas catheter





- Studies


Medications List Reviewed: Yes





Assessment And Plan





- Current Problems (Diagnosis)


(1) Hematuria


Current Visit: Yes   Status: Resolved   


Plan: 


Gross Hematuria most likely 2.2 to UTI vs structural abnormality.  Restarted 

after Lovenox


-Urology consulted. Appreciated Reccs


-patient is planned for cystoscopy tomorrow.


-hold Lovenox at this time


-NPO after midnight











Qualifiers: 


   Hematuria type: gross   Qualified Code(s): R31.0 - Gross hematuria   





(2) Hip fracture


Onset Date: ~04/07/19   Current Visit: Yes   Status: Acute   


Plan: 


Hip Fracture s.p Fall 


-Fall precautions. 


-orthopedics Consulted. Reccs Appreciated 


   -S/p ORIF POD 5


-PT/OT consulted. 


-DVT ppx with lovenox 40mg BID.  Currently however on hold due to recent 

hematuria


Qualifiers: 


   Encounter type: initial encounter   Fracture type: closed   Laterality: 

right   Qualified Code(s): S72.001A - Fracture of unspecified part of neck of 

right femur, initial encounter for closed fracture   





(3) COPD (chronic obstructive pulmonary disease)


Current Visit: Yes   Status: Chronic   


Plan: 


Duo nebs, steroids, oxygen at this time.


Qualifiers: 


   COPD type: emphysema   Emphysema type: unspecified   Qualified Code(s): 

J43.9 - Emphysema, unspecified   





(4) Diabetes mellitus


Current Visit: Yes   Status: Chronic   


Qualifiers: 


   Diabetes mellitus type: type 2   Diabetes mellitus long term insulin use: 

without long term use   Diabetes mellitus complication status: with unspecified 

complications   Qualified Code(s): E11.8 - Type 2 diabetes mellitus with 

unspecified complications   





(5) HTN (hypertension)


Current Visit: Yes   Status: Chronic   


Qualifiers: 


   Hypertension type: essential hypertension   Qualified Code(s): I10 - 

Essential (primary) hypertension   





(6) Parkinson disease


Current Visit: Yes   Status: Chronic   





(7) Seizure disorder


Current Visit: Yes   Status: Chronic   





- Plan





Pending clinical improvement at this time.  Patient scheduled for cystoscopy 

with urology tomorrow for hematuria.  Patient also pending Placement now o SNF 

for PT/OT.  Hold Lovenox at this time due to recent hematuria no Xarelto or 

eliquis due to being on Dilantin.  Will followup post procedure with urology








Discharge Plan: Other


Plan to discharge in: Greater than 2 days





- Code Status/Comfort Care


Code Status Assessed: Yes


Critical Care: No

## 2019-04-15 PROCEDURE — 0TJB8ZZ INSPECTION OF BLADDER, VIA NATURAL OR ARTIFICIAL OPENING ENDOSCOPIC: ICD-10-PCS

## 2019-04-15 RX ADMIN — HUMAN INSULIN SCH: 100 INJECTION, SOLUTION SUBCUTANEOUS at 16:30

## 2019-04-15 RX ADMIN — CARBIDOPA AND LEVODOPA SCH: 25; 100 TABLET ORAL at 13:00

## 2019-04-15 RX ADMIN — EZETIMIBE SCH MG: 10 TABLET ORAL at 14:55

## 2019-04-15 RX ADMIN — PHENYTOIN SODIUM SCH MG: 100 CAPSULE, EXTENDED RELEASE ORAL at 21:39

## 2019-04-15 RX ADMIN — TEMAZEPAM PRN MG: 15 CAPSULE ORAL at 21:39

## 2019-04-15 RX ADMIN — CARBIDOPA AND LEVODOPA SCH TAB: 25; 100 TABLET ORAL at 21:39

## 2019-04-15 RX ADMIN — Medication SCH ML: at 09:24

## 2019-04-15 RX ADMIN — HYDROCODONE BITARTRATE AND ACETAMINOPHEN PRN TAB: 7.5; 325 TABLET ORAL at 14:54

## 2019-04-15 RX ADMIN — CEFTRIAXONE SCH MLS: 1 INJECTION, SOLUTION INTRAVENOUS at 09:22

## 2019-04-15 RX ADMIN — CARBIDOPA AND LEVODOPA SCH TAB: 25; 100 TABLET ORAL at 14:55

## 2019-04-15 RX ADMIN — Medication SCH ML: at 21:40

## 2019-04-15 RX ADMIN — CARBIDOPA AND LEVODOPA SCH: 25; 100 TABLET ORAL at 09:00

## 2019-04-15 RX ADMIN — DONEPEZIL HYDROCHLORIDE SCH MG: 5 TABLET ORAL at 21:38

## 2019-04-15 RX ADMIN — DOCUSATE SODIUM SCH MG: 100 CAPSULE, LIQUID FILLED ORAL at 14:56

## 2019-04-15 RX ADMIN — HUMAN INSULIN SCH: 100 INJECTION, SOLUTION SUBCUTANEOUS at 21:00

## 2019-04-15 RX ADMIN — ROSUVASTATIN CALCIUM SCH MG: 10 TABLET, COATED ORAL at 21:39

## 2019-04-15 RX ADMIN — HUMAN INSULIN SCH: 100 INJECTION, SOLUTION SUBCUTANEOUS at 11:30

## 2019-04-15 RX ADMIN — MONTELUKAST SODIUM SCH MG: 10 TABLET, FILM COATED ORAL at 14:55

## 2019-04-15 RX ADMIN — PANTOPRAZOLE SODIUM SCH: 40 TABLET, DELAYED RELEASE ORAL at 05:13

## 2019-04-15 RX ADMIN — HUMAN INSULIN SCH: 100 INJECTION, SOLUTION SUBCUTANEOUS at 07:30

## 2019-04-15 NOTE — P.PN
Subjective


Date of Service: 04/15/19


Primary Care Provider: unknown


Chief Complaint: Right hip fracture


Subjective: Doing well





Physical Examination





- Vital Signs


Temperature: 98.2 F


Blood Pressure: 142/68


Pulse: 83


Respirations: 16


Pulse Ox (%): 99





- Physical Exam


General: Alert, In no apparent distress, Cooperative


HEENT: Atraumatic


Neck: Supple


Respiratory: Clear to auscultation bilaterally, Normal air movement


Cardiovascular: Normal pulses, Regular rate/rhythm


Gastrointestinal: Normal bowel sounds, Soft and benign, Non-distended


Integumentary: No erythema, No warmth, No cyanosis





- Studies


Medications List Reviewed: Yes





Assessment & Plan


Discharge Plan: Other (Skilled nursing facility)


Plan to discharge in: 24 Hours


Physician Review Additional Text: 





Impression:


Gross hematuria likely related to UTI vs urological etiology


Hip fracture status post fall status post open reduction internal fixation, 

postop day 6


COPD


Diabetes mellitus type 2


Hypertension


Parkinson's


Seizure disorder


Dementia





Plan:


Gross hematuria likely related to UTI vs urological etiology:  Patient seen and 

evaluated by urology.  Patient to have cystoscopy.  Await further 

recommendation.  Continue IV antibiotic therapy.  So far urine culture 

negative.  Will address with urology.  Anticipate skilled placement.  Will 

discuss with .


Hip fracture status post fall status post open reduction internal fixation, 

postop day 6:  Continue physical therapy.  Patient will require skilled 

placement.  Continue to hold DVT prophylaxis.  Will discuss with urology when 

to restart..  Will adjust per renal function.


COPD:  Maintain sats above 90%.  Continue medication.


Diabetes mellitus type 2:  Continue Accu-Cheks.  Will monitor closely.


Hypertension:  Continue medication.  Will monitor closely.


Parkinson's:  Continue medication.  Will monitor for fall.


Seizure disorder:  Continue medication.


Dementia:  Continue with medication.


Time Spent Managing Pts Care (In Minutes): 55

## 2019-04-16 LAB
HCT VFR BLD CALC: 30.5 % (ref 39.6–49)
LYMPHOCYTES # SPEC AUTO: 0.9 K/UL (ref 0.7–4.9)
PMV BLD: 7.3 FL (ref 7.6–11.3)
RBC # BLD: 3.53 M/UL (ref 4.33–5.43)

## 2019-04-16 RX ADMIN — ROSUVASTATIN CALCIUM SCH MG: 10 TABLET, COATED ORAL at 21:59

## 2019-04-16 RX ADMIN — CEFTRIAXONE SCH MLS: 1 INJECTION, SOLUTION INTRAVENOUS at 12:10

## 2019-04-16 RX ADMIN — CARBIDOPA AND LEVODOPA SCH TAB: 25; 100 TABLET ORAL at 16:32

## 2019-04-16 RX ADMIN — HYDROCODONE BITARTRATE AND ACETAMINOPHEN PRN TAB: 7.5; 325 TABLET ORAL at 12:10

## 2019-04-16 RX ADMIN — EZETIMIBE SCH MG: 10 TABLET ORAL at 09:17

## 2019-04-16 RX ADMIN — Medication SCH ML: at 22:00

## 2019-04-16 RX ADMIN — HUMAN INSULIN SCH: 100 INJECTION, SOLUTION SUBCUTANEOUS at 16:23

## 2019-04-16 RX ADMIN — DONEPEZIL HYDROCHLORIDE SCH MG: 5 TABLET ORAL at 21:59

## 2019-04-16 RX ADMIN — HUMAN INSULIN SCH: 100 INJECTION, SOLUTION SUBCUTANEOUS at 11:30

## 2019-04-16 RX ADMIN — DOCUSATE SODIUM SCH MG: 100 CAPSULE, LIQUID FILLED ORAL at 09:17

## 2019-04-16 RX ADMIN — PANTOPRAZOLE SODIUM SCH MG: 40 TABLET, DELAYED RELEASE ORAL at 05:50

## 2019-04-16 RX ADMIN — CARBIDOPA AND LEVODOPA SCH TAB: 25; 100 TABLET ORAL at 12:10

## 2019-04-16 RX ADMIN — PHENYTOIN SODIUM SCH MG: 100 CAPSULE, EXTENDED RELEASE ORAL at 22:00

## 2019-04-16 RX ADMIN — CARBIDOPA AND LEVODOPA SCH TAB: 25; 100 TABLET ORAL at 21:59

## 2019-04-16 RX ADMIN — Medication SCH ML: at 09:18

## 2019-04-16 RX ADMIN — HUMAN INSULIN SCH: 100 INJECTION, SOLUTION SUBCUTANEOUS at 07:30

## 2019-04-16 RX ADMIN — HYDROCODONE BITARTRATE AND ACETAMINOPHEN PRN TAB: 7.5; 325 TABLET ORAL at 21:59

## 2019-04-16 RX ADMIN — HUMAN INSULIN SCH: 100 INJECTION, SOLUTION SUBCUTANEOUS at 20:34

## 2019-04-16 RX ADMIN — MONTELUKAST SODIUM SCH MG: 10 TABLET, FILM COATED ORAL at 09:17

## 2019-04-16 RX ADMIN — CARBIDOPA AND LEVODOPA SCH TAB: 25; 100 TABLET ORAL at 09:17

## 2019-04-16 NOTE — OP
Surgeon:  Erik Watts MD



Preoperative Diagnosis:  History of gross hematuria, status post hip 
replacement.



Postoperative Diagnosis:  History of gross hematuria, status post hip 
replacement.



Procedure Performed:  Diagnostic cystoscopy.



Findings:  Erythematous sore spot like an excoriation on the left side of the 
penile bulbar urethra from traumatic reddy removal few nights ago, had a clot 
attached to it.  No signs of tumor or any growth there. Prostatic urethra was 
obstructive from BPH.  Bladder had a diverticulum at the posterior wall, 
otherwise within normal limits home.



Indication For Procedure:  A 78-year-old gentleman, who has been having a 
history of gross hematuria intermittently.  He is on blood thinners while in 
the hospital, status post hip replacement.  He had a renal ultrasound and 
bladder ultrasound, did not find any significant findings.  He was given all 
general information, alternatives, and risks because the daughter was insistent 
that we further diagnose why he is bleeding.  Next, he have a flexible 
cystoscopy since we were unable to do cystoscopy in lithotomy position due to 
his hip surgery.  Proper informed consent was obtained.



Description Of Procedure:  He was taken to the operative room and placed in a 
supine position.  The area was prepped and draped.  We inserted a flexible 
cystoscope per urethra into the bladder.  At first, there was no clot in the 
urethra.  No bleeding seen in the urethra, but once scope went past this area, 
it became irritated and one could see the clot was formed.  The prostatic 
urethra appeared obstructive approximately 3 cm in length.  There was no 
obstructive median lobe.  Bladder appears slightly trabeculated, grade 1 to 2/4
, small diverticulum of the posterior bladder wall.  The rest of the bladder 
showed no tumors, no stones and no hematuria within the bladder.  We 
retroflexed the scope looking backwards towards the prostate.  There was no 
significant protrusion.  Normal orifices.  The scope was then removed slowly 
and taking pictures of the excoriated area on the left side, we are seeing 
picture #4.  An image #1 was diverticulum.  An image #2 was the bladder, some 
irritation due to Reddy balloon most likely.  Image #3 showed prostatic urethra 
and image #4 showed the urethra we are concerned about.  We then placed a 22-
[place] regular 3-way hematuria catheter in the bladder for CBI irrigation.  We 
can leave this while he was on the Lovenox and he hopefully tamponade the area 
of the urethra to allow it to heal.  I will give it about 7 days to heal.





CHARLOTTE/BETTY

DD:  04/15/2019 13:50:07   Voice ID:  [id number]

DT:  04/16/2019 00:16:26   Report ID:  [id number]

MTDD

## 2019-04-16 NOTE — P.PN
Date of Service: 04/16/19 (POD#7)





S:  MISSED PATIENT YESTERDAY AS HE WAS DOWN STAIRS IN SURGERY FOR CYSTOSCOPY.  

TODAY THE PATIENT IS SITTING IN HIS BEDSIDE CHAIR, RESPONSIVE AND IN GOOD 

SPIRITS. HE EXPRESSES CONCERN OVER HIS CONFUSION OVER THE PAST FEW DAYS. HE IS 

VERY AWARE OF THE DIFFICULTIES HE HAS HAD WITH SPEECH AND CONFUSION.


O:  AFEBRILE, VSS, HGB 10.4 THIS AM, WAS 11.5 YESTERDAY. WBC 10.2, N76.5%. 

BANDAGE CLEAN DRY AND INTACT. PATIENT DEMONSTRATES GOOD MOTION AND STRENGTH FOR 

RIGHT ANKLE AND FOOT. EFFORT TO EXTEND THE RIGHT KNEE TOOK 2 OR 3 EFFORTS 

BEFORE MOVEMENT ACTUALLY OCCURRED, POSSIBLY A VERSION OF COGWHEEL RIGIDITY DUE 

TO THE PATIENT'S PARKINSONISM.  PT NOTES AVAILABLE FOR REVIEW TODAY INDICATE 8 

FEET TRAVELED WITH FRONT WHEELED ROLLING WALKER WITH MAXIMUM ASSIST WAS 

FOLLOWED BY GOING 50 FEET WITH MAXIMUM ASSIST WANING TO MINIMUM ASSIST BY 

COMPLETION OF THE DISTANCE.


A:  POST OP DAY #7.  SNF MAY BE AVAILABLE AS EARLY AS TOMORROW.


P:  CONTINUE MOBILIZATION. TRANSFERS AND WEIGHTBEARING AS TOLERATED RIGHT LOWER 

EXTREMITY, AAROM & PROM.


     WOULD USE ANTICOAGULATION FOR 30 D POST OP IF POSSIBLE. PATIENT HAD VOGT 

TRAUMA AND VOGT WAS INSERTED FOR COMPRESSION OF THE ABRADED AREA AT THE END OF 

YESTERDAY'S PROCEDURE TO BE LEFT IN PLACE FOR 7D.


     CHANGE AQUACEL BANDAGE PRIOR TO DISCHARGE WITH ALCOHOL SWABS AND NEW 

AQUACEL BANDAGE.


     F/U MY OFFICE ~1 WEEK POST DISCHARGE FOR REMOVAL OF STAPLES WITH 

APPLICATION OF NEW AQUACEL BANDAGE.

## 2019-04-16 NOTE — P.PN
Subjective


Date of Service: 04/16/19


Primary Care Provider: unknown


Chief Complaint: Right hip fracture


Subjective: Doing well





Physical Examination





- Vital Signs


Temperature: 98.2 F


Blood Pressure: 141/64


Pulse: 76


Respirations: 20


Pulse Ox (%): 98





- Physical Exam


General: Alert, In no apparent distress


HEENT: Atraumatic


Neck: Supple


Respiratory: Clear to auscultation bilaterally, Normal air movement


Cardiovascular: Normal pulses, Regular rate/rhythm


Gastrointestinal: Normal bowel sounds, Soft and benign, Non-distended


Neurological: Normal speech, Normal strength at 5/5 x4 extr, Normal tone


Urinary: Thomas catheter





- Studies


Medications List Reviewed: Yes





Assessment & Plan


Discharge Plan: Other (Skilled nursing facility)


Plan to discharge in: 24 Hours


Physician Review Additional Text: 





Impression:


Gross hematuria related to excoriation on the left penile bulbar urethral area 

likely from traumatic Thomas removal


Hip fracture status post fall status post open reduction internal fixation, 

postop day 7


COPD


Diabetes mellitus type 2


Hypertension


Parkinson's


Seizure disorder


Dementia





Plan:


Gross hematuria related to excoriation on the left penile bulbar urethral area 

likely from traumatic Thomas removal:  Patient seen and evaluated by urology.  

Patient cystoscopy yesterday with Thomas catheter placement.  An area of 

excoriation on the left penile bulbar urethral area noted. This is likely from 

traumatic Thomas removal couple a days prior.  No further bleeding noted. Will 

restart DVT prophylaxis-Lovenox.  Will discuss with urology about future plan 

of care..  Await further recommendation.  Will discontinue IV antibiotic therapy


Hip fracture status post fall status post open reduction internal fixation, 

postop day 7:  Continue physical therapy.  Patient will require skilled 

placement.  Will restart DVT prophylaxis


COPD:  Maintain sats above 90%.  Will wean off oxygen.  Continue incentive 

spirometer.  Continue medication.


Diabetes mellitus type 2:  Continue Accu-Cheks and insulin sliding scale.  Will 

monitor closely.


Hypertension:  Continue medication.  Will monitor closely.  Overall stable.


Parkinson's:  Continue medication.  Will monitor for fall.  Overall stable.


Seizure disorder:  Continue medication.


Dementia:  Continue with medication.


Time Spent Managing Pts Care (In Minutes): 55

## 2019-04-16 NOTE — RAD REPORT
EXAM DESCRIPTION:  RAD - Barium Swallow Modified - 4/16/2019 3:57 pm

 

CLINICAL HISTORY:  R/O Aspiration

Dysphagia, suspected aspiration

 

COMPARISON:  Renal Ultrasound-Complete dated 4/11/2019

 

TECHNIQUE:  The patient was given liquid, semi-solid and solid forms of barium. Lateral view fluorosc
opic imaging was performed in conjunction with speech pathology service.

 

FINDINGS:  Laryngeal penetration: not cleared with thin by cup

Aspiration: no cough with thin by straw

Pharyngeal residue: mod-severe vallecular, moderate pyriform

Delayed swallow onset (1-3 sec) chin tuck and sour bolus not effective.

 

 

Total fluoroscopy time: 2 minutes and 13 seconds

## 2019-04-16 NOTE — PN
Subjective:  Patient feels well, doing well.



Objective:  Vital Signs:  98.3, 72, 20, 134/58, 97% sat.  

Physical exam unchanged. 



Urine is clear.  No bleeding.  He put out 750 cc recorded last night.  CBI is 
hanging with the 3-way catheter in place from OR.



Lab Data:  Shows white count down to 10.2, H and H 10 and 30.5, platelets 477.  
Chemistry shows glucose 156.



Assessment:  Thomas trauma, urethral trauma on the left side, left inner bulbar 
urethra.  Thomas catheter is in place for 7 days.   Urine cultures only show 
mixed tripp so far.  So we will continue with medical mangement.





CHARLOTTE/BETTY

DD:  04/16/2019 09:35:24   Voice ID:  [id number]

DT:  04/16/2019 15:10:12   Report ID:  [id number]

MTDD

## 2019-04-17 LAB
HCT VFR BLD CALC: 30.2 % (ref 39.6–49)
LYMPHOCYTES # SPEC AUTO: 1.2 K/UL (ref 0.7–4.9)
PMV BLD: 7.3 FL (ref 7.6–11.3)
RBC # BLD: 3.5 M/UL (ref 4.33–5.43)

## 2019-04-17 RX ADMIN — EZETIMIBE SCH MG: 10 TABLET ORAL at 08:20

## 2019-04-17 RX ADMIN — MONTELUKAST SODIUM SCH MG: 10 TABLET, FILM COATED ORAL at 08:17

## 2019-04-17 RX ADMIN — PANTOPRAZOLE SODIUM SCH MG: 40 TABLET, DELAYED RELEASE ORAL at 06:51

## 2019-04-17 RX ADMIN — Medication SCH: at 09:00

## 2019-04-17 RX ADMIN — HUMAN INSULIN SCH: 100 INJECTION, SOLUTION SUBCUTANEOUS at 11:30

## 2019-04-17 RX ADMIN — DOCUSATE SODIUM SCH MG: 100 CAPSULE, LIQUID FILLED ORAL at 08:17

## 2019-04-17 RX ADMIN — HUMAN INSULIN SCH: 100 INJECTION, SOLUTION SUBCUTANEOUS at 07:30

## 2019-04-17 RX ADMIN — CARBIDOPA AND LEVODOPA SCH TAB: 25; 100 TABLET ORAL at 08:17

## 2019-04-17 NOTE — P.PN
Date of Service: 04/17/19 (POD#8)





S:  SAW PATIENT THIS MORNING, RECLINING IN BED.  RELAXED WITHOUT DISCOMFORT, 

ANTICIPATING DISCHARGE TODAY OR TOMORROW IN GOOD SPIRITS. 


O:  AFEBRILE, VSS, HGB 10.3 THIS AM, WAS 10.4 YESTERDAY. WBC 8.1, N65%. BANDAGE 

CLEAN DRY AND INTACT. PATIENT DEMONSTRATES GOOD MOTION AND STRENGTH FOR RIGHT 

ANKLE AND FOOT WITH DORSIFLEXION AND PLANTARFLEXION. PATIENT ABLE TO EXTEND 

KNEE AND DO STRAIGHT LEG LIFTS, AND IS ENCOURAGED TO DO SO SPONTANEOUSLY 

THROUGHOUT THE DAY AS PREPARATION TO DOING BETTER WITH PHYSICAL THERAPY. PT 

NOTES INDICATE 30 FEET TRAVELED WITH FRONT WHEELED ROLLING WALKER WITH MINIMAL 

ASSIST. A REST STOP IN THE WHEELCHAIR WAS FOLLOWED BY GOING 90 FEET WITH 

MINIMUM ASSIST.


A:  POST OP DAY #8.  SNF MAY BE AVAILABLE TODAY.


P:  CONTINUE MOBILIZATION. TRANSFERS AND WEIGHTBEARING AS TOLERATED RIGHT LOWER 

EXTREMITY, AAROM & PROM.


     WOULD USE ANTICOAGULATION FOR 30 D POST OP IF POSSIBLE. PATIENT HAD VOGT 

TRAUMA AND VOGT WAS INSERTED FOR COMPRESSION OF THE ABRADED AREA AT THE END OF 

YESTERDAY'S PROCEDURE TO BE LEFT IN PLACE FOR 7D.


     CHANGE AQUACEL BANDAGE PRIOR TO DISCHARGE WITH ALCOHOL SWABS AND NEW 

AQUACEL BANDAGE.


     F/U MY OFFICE ~1 WEEK POST DISCHARGE FOR REMOVAL OF STAPLES WITH 

APPLICATION OF NEW AQUACEL BANDAGE.

## 2019-04-17 NOTE — P.DS
Admission Date: 04/07/19


Discharge Date: 04/17/19


Primary Care Provider: unknown


Disposition: TRANSFER TO SNF - REHAB


Discharge Condition: GOOD


Reason for Admission: Right hip fracture


Consultations: 





Orthopedics-Dr. Lucas


Cardiology-Dr. Coffey


Urology-Dr. Watts


Speech therapy


Procedures: 





Hip xray:





Renal US: 





Bladder US: 





Surgery: 





Urological procedure: 





Medical Problem List: 





Hospital Course: 








Mild-moderate oral dysphagia characterized by decreased sensation, reduced 

bolus procurement and control resulting in premature spillage over BOT with 

pureed solids as well as lingual pumping/rocking with honey, pudding, and dry 

solids resulting in mildly prolonged oral transit time and delay in A-P 

transit. 





Moderate-severe pharyngeal dysphagia characterized by deep laryngeal 

penetration (to the folds and not cleared by reflexive cough or subsequent 

swallows likely to be aspirated by way of gravity) with thin by self-

administered cup sip, GROSS silent aspiration of thin liquid barium AFTER the 

swallow due to pyriform sinus residuals falling into airway, decreased 

epiglottic inversion, delayed swallow response (~1-3s), and significant residue 

in valleculae and pyriform residues with all consistencies (worse with honey 

and pudding). Moderate esophageal stasis with regular dry solids, >30 seconds, 

not cleared with liquid wash.





Patient is at a HIGH risk for aspiration and aspiration pneumonia/pneumonitis.  





Diet Recommendations: Regular solids, Nectar-thickened liquids, Whole 

medications administered one at a time.


Compensatory Strategies: Upright trunk positioning during all oral intake and 

for at least 45 minutes following due to esophageal stasis and reflux, slow rate

, small bites, small sips, alternate bites/sips, multiple swallows, increased 

rest between presentations.  Patient could benefit from 5-6 small meals per day 

as opposed to 3 moderate-large meals per day. 





Consults recommended: GI and outpatient or home health ST. 





Stimulability Testing 


Maneuvers/Strategies attempted: chin tuck, sour bolus, cough + re-swallow


Results: ineffective in reducing aspiration.





Results and recommendations were given to both patient and nurse verbally; both 

verbalized comprehension.  Patient and family will require further education/

training by ST via outpatient or home health therapy.





Initialized on 04/17/19 09:24 - END OF NOTE








Vital Signs/Physical Exam: 














Temp Pulse Resp BP Pulse Ox


 


 98.5 F   78   20   144/73 H  96 


 


 04/17/19 08:00  04/17/19 08:00  04/17/19 08:00  04/17/19 08:00  04/17/19 08:00








Laboratory Data at Discharge: 














WBC  8.1 K/uL (4.3-10.9)  D 04/17/19  03:24    


 


Hgb  10.3 g/dL (13.6-17.9)  L  04/17/19  03:24    


 


Hct  30.2 % (39.6-49.0)  L  04/17/19  03:24    


 


Plt Count  524 K/uL (152-406)  H  04/17/19  03:24    


 


PT  12.5 SECONDS (9.5-12.5)   04/07/19  21:48    


 


INR  1.06   04/07/19  21:48    


 


Sodium  137 mmol/L (136-145)   04/14/19  05:44    


 


Potassium  3.5 mmol/L (3.5-5.1)   04/14/19  05:44    


 


BUN  16 mg/dL (7-18)   04/14/19  05:44    


 


Creatinine  0.68 mg/dL (0.55-1.3)   04/14/19  05:44    


 


Glucose  121 mg/dL ()  H  04/14/19  05:44    


 


Magnesium  2.0 mg/dL (1.8-2.4)   04/08/19  04:33    


 


Total Bilirubin  0.9 mg/dL (0.2-1.0)   04/14/19  05:44    


 


AST  18 U/L (15-37)   04/14/19  05:44    


 


ALT  10 U/L (12-78)  L  04/14/19  05:44    


 


Alkaline Phosphatase  98 U/L ()   04/14/19  05:44    








Home Medications: 








Aspirin 81 mg PO DAILY 04/08/19 


Carbidopa/Levodopa [Carbidopa-Levo  mg Odt] 2 tab PO QID 04/08/19 


Docusate [Colace Cap] 100 mg PO DAILY 04/08/19 


Donepezil HCl 10 mg PO DAILY 04/08/19 


Ezetimibe [Zetia] 10 mg PO DAILY 04/08/19 


Lisinopril [Prinivil] 10 mg PO DAILY 04/08/19 


Montelukast [Singulair] 10 mg PO DAILY 04/08/19 


PHENYTOIN ER Cap [Dilantin ER Cap] 4 cap PO BEDTIME 04/08/19 


Rosuvastatin Calcium [Crestor] 20 mg PO BEDTIME 04/08/19

## 2019-04-17 NOTE — P.DS
Admission Date: 04/07/19


Discharge Date: 04/17/19


Primary Care Provider: unknown


Disposition: TRANSFER TO SNF - REHAB


Discharge Condition: GOOD


Reason for Admission: Right hip fracture


Consultations: 





Orthopedics-Dr. Lucas


Cardiology-Dr. Coffey


Urology-Dr. Watts


Speech therapy


Procedures: 





Hip xray:


FINDINGS:  Subcapital fracture is present of the proximal right femur with 

varus angulation. No dislocation seen.





CT head/Neck: 


IMPRESSION:  1.   No acute intracranial findings.


2.   Findings suggestive of a large left maxillary sinus mucus retention cyst 

versus polyp.


3.   Moderate spondylosis of the cervical spine without acute findings.


4.   Apparent enlargement of the uvula with concern for continuity with the 

ostiomeatal complex and the paranasal sinuses. This raises the possibility of 

an antrochoanal polyp. 





Renal US: 


COMPARISON:  None. 


FINDINGS:  The right kidney measures 12 cm with an increased echotexture. 


The left kidney measures 12 cm with an increased echotexture. 1 centimeter left 

renal cyst 


Hydronephrosis is not seen. 


IMPRESSION:  Increased renal echotexture consistent with parenchymal disease





Bladder US: 


FINDINGS:  Prevoid bladder volume equals 61 cc 


Postvoid bladder volume equals 22 cc 


A Reddy catheter is present within the bladder. 


No ascites seen 


IMPRESSION:  Prevoid bladder volume equals 61 cc 


Postvoid bladder volume equals 22 cc





Surgery: 


Date of Procedure:  04/09/2019


Surgeon:  Johnny Lucas MD


Assistant:  CHARLENE Cobb, who gave very necessary first assist services 

throughout this procedure.


Preoperative Diagnosis:  Closed, displaced right femoral neck fracture.


Postoperative Diagnosis:  Closed, displaced right femoral neck fracture.


Primary Procedure:  Insertion of unipolar cemented hemiarthroplasty for right 

femoral neck fracture.





Urological procedure: 


Surgeon:  Erik Watts MD


Preoperative Diagnosis:  History of gross hematuria, status post hip 

replacement.


Postoperative Diagnosis:  History of gross hematuria, status post hip 

replacement.


Procedure Performed:  Diagnostic cystoscopy.


Findings:  Erythematous sore spot like an excoriation on the left side of the 

penile bulbar urethra from traumatic reddy removal few nights ago, had a clot 

attached to it.  No signs of tumor or any growth there. Prostatic urethra was 

obstructive from BPH.  Bladder had a diverticulum at the posterior wall, 

otherwise within normal limits home.





Modified Barium Swallow: 


COMPARISON:  Renal Ultrasound-Complete dated 4/11/2019 


TECHNIQUE:  The patient was given liquid, semi-solid and solid forms of barium. 

Lateral view fluoroscopic imaging was performed in conjunction with speech 

pathology service. 


FINDINGS:  Laryngeal penetration: not cleared with thin by cup


Aspiration: no cough with thin by straw


Pharyngeal residue: mod-severe vallecular, moderate pyriform


Delayed swallow onset (1-3 sec) chin tuck and sour bolus not effective. 


Total fluoroscopy time: 2 minutes and 13 seconds





Medical Problem List: 


Gross hematuria related to excoriation on the left penile bulbar urethral area 

likely from Traumatic Reddy removal


Closed, displaced right femoral neck fracture status post mechanical fall 

postop insertion of unipolar cemented hemiarthroplasty


COPD, chronic


Diabetes mellitus type 2, diet controlled


Hypertension


Hyperlipidemia


Parkinson's


Seizure disorder


Dementia


Dysphagia


Anemia likely of chronic disease


Brief History of Present Illness: 





78-year-old  male presented to emergency room after a mechanical fall.

  Patient found to have left hip fracture.  Patient was admitted for treatment.


Hospital Course: 





Patient admitted for right hip fracture status post mechanical fall.  Patient 

was seen and evaluated by orthopedics.  Surgery recommended.  Patient had 

insertion of unipolar cemented hemiarthroplasty.  From orthopedic standpoint 

patient did well.  Patient was accepted to go to skilled facility continue 

rehabilitation.  At discharge patient will follow up with orthopedics within 1 

week for staple removal.  Patient will continue with tramadol 50 mg 1 pill 

twice daily as needed for pain. Patient will continue with Lovenox 30 mg subcu 

daily for DVT prophylaxis for at least 20 days.  Fall precautions in place.  

Patient  will continue with physical therapy.





During the course of his stay patient had gross hematuria.  Patient was seen 

and evaluated by urology.  Cystoscopy was performed.  Excoriation of the left 

penile bulbar urethral area was noted. This was likely from trauma from recent 

reddy removal.  Hematuria was controlled.  Patient did well after cystoscopy.  

Urine culture negative for infection.  At discharge patient will continue with 

Reddy catheter for 1 more week.  After that time Reddy catheter can be removed.

  Recommend to follow up with urology within 1-2 weeks to follow up this 

hospitalization.





Patient also had some dysphagia prior to discharge to skilled facility.  

Patient was seen and evaluated by speech therapy.  Modified barium swallow 

performed.  Patient found to have mild to moderate oral dysphagia, mild to 

severe pharyngeal dysphagia dietary recommendations included regular solids 

with nectar thickened liquids.  Home medications to be administered one at a 

time is recommended.  Compensatory strategies to be continued.  Patient to 

remain upright during all oral intake and for at least 45 min after eating.  

Patient to continue with eating at a slower rate, small bites, small sips, 

alternate bites/sips, multiple swallows, and  increased rest between 

presentations is recommended.  Patient would benefit from 5-6 small meals a day 

instead of 3 large moderate meals.  Patient to continue with speech therapy.  

Initial CT neck showed possible large uvula.  Evaluation by ENT as an 

outpatient is recommended.  Further recommendation is to follow up with ENT and/

or GI for further recommendation.





Patient with underlying Parkinson's. This has remained stable.  At discharge he 

will continue with carbidopa levodopa 25/100 mg 2 pills 4 times a day.  

Recommend follow up with neurology as an outpatient.





Patient with underlying seizure disorder.  At discharge he will continue with 

Dilantin  mg 4 pills at bedtime. Recommend follow up with neurology as an 

outpatient.





Patient with dementia.  Patient will continue with Aricept 10 mg daily.  

Recommend to follow up with neurology as directed.





Patient with hypertension.  At discharge he will continue with lisinopril 10 mg 

1 pill daily.  Recommend to maintain blood pressures less 150/80.  Further 

adjustment can be done by his PCP.





Patient with hyperlipidemia.  At discharge he will continue with Zetia 10 mg 

daily and Crestor 20 mg daily.





Patient with history of diabetes.  This appears to be diet controlled.  

Continue to monitor blood sugars.  Recommend to maintain blood sugars less 140 

fasting and less than 200 after meals.  Further evaluation and monitoring can 

be done by his PCP.





Patient likely with underlying anemia of chronic disease. This remained stable 

postoperatively and with recent gross hematuria.  Recommend to follow up with 

lab-CBC in 1-2 weeks to monitor stability.


Vital Signs/Physical Exam: 














Temp Pulse Resp BP Pulse Ox


 


 98.5 F   78   20   144/73 H  96 


 


 04/17/19 08:00  04/17/19 08:00  04/17/19 08:00  04/17/19 08:00  04/17/19 08:00








General: Alert, Demented


HEENT: Atraumatic


Neck: Supple


Respiratory: Clear to auscultation bilaterally, Normal air movement


Cardiovascular: Normal pulses, Regular rate/rhythm


Gastrointestinal: Normal bowel sounds, Soft and benign, Non-distended


Musculoskeletal: No erythema, No tenderness, No warmth


Integumentary: No erythema, No warmth, No cyanosis


Neurological: Normal speech, Normal strength at 5/5 x4 extr, Normal tone, 

Dementia


Laboratory Data at Discharge: 














WBC  8.1 K/uL (4.3-10.9)  D 04/17/19  03:24    


 


Hgb  10.3 g/dL (13.6-17.9)  L  04/17/19  03:24    


 


Hct  30.2 % (39.6-49.0)  L  04/17/19  03:24    


 


Plt Count  524 K/uL (152-406)  H  04/17/19  03:24    


 


PT  12.5 SECONDS (9.5-12.5)   04/07/19  21:48    


 


INR  1.06   04/07/19  21:48    


 


Sodium  137 mmol/L (136-145)   04/14/19  05:44    


 


Potassium  3.5 mmol/L (3.5-5.1)   04/14/19  05:44    


 


BUN  16 mg/dL (7-18)   04/14/19  05:44    


 


Creatinine  0.68 mg/dL (0.55-1.3)   04/14/19  05:44    


 


Glucose  121 mg/dL ()  H  04/14/19  05:44    


 


Magnesium  2.0 mg/dL (1.8-2.4)   04/08/19  04:33    


 


Total Bilirubin  0.9 mg/dL (0.2-1.0)   04/14/19  05:44    


 


AST  18 U/L (15-37)   04/14/19  05:44    


 


ALT  10 U/L (12-78)  L  04/14/19  05:44    


 


Alkaline Phosphatase  98 U/L ()   04/14/19  05:44    








Home Medications: 








Aspirin 81 mg PO DAILY 04/08/19 


Carbidopa/Levodopa [Carbidopa-Levo  mg Odt] 2 tab PO QID 04/08/19 


Docusate [Colace Cap*] 100 mg PO DAILY 04/08/19 


Donepezil HCl 10 mg PO DAILY 04/08/19 


Ezetimibe [Zetia*] 10 mg PO DAILY 04/08/19 


Lisinopril [Prinivil*] 10 mg PO DAILY 04/08/19 


Montelukast [Singulair*] 10 mg PO DAILY 04/08/19 


PHENYTOIN ER Cap [Dilantin ER Cap*] 4 cap PO BEDTIME 04/08/19 


Rosuvastatin Calcium [Crestor] 20 mg PO BEDTIME 04/08/19 


Enoxaparin Sodium [Lovenox 30 MG INJ*] 30 mg SQ DAILY 5 PM #20 syr 04/17/19 


Tramadol HCl [Ultram] 50 mg PO BID PRN #15 tablet 04/17/19 





New Medications: 


Enoxaparin Sodium [Lovenox 30 MG INJ*] 30 mg SQ DAILY 5 PM #20 syr


Tramadol HCl [Ultram] 50 mg PO BID PRN #15 tablet


 PRN Reason: Pain Scale 2-4 (Mild)


Patient Discharge Instructions: 1.  Patient to be discharged to skilled 

facility to continue rehabilitation.  2.  Patient admitted for right hip 

fracture status post mechanical fall.  Patient was seen and evaluated by 

orthopedics.  Surgery recommended.  Patient had insertion of unipolar cemented 

hemiarthroplasty.  From orthopedic standpoint patient did well.  Patient was 

accepted to go to skilled facility continue rehabilitation.  At discharge 

patient will follow up with orthopedics within 1 week for staple removal.  

Patient will continue with tramadol 50 mg 1 pill twice daily as needed for 

pain. Patient will continue with Lovenox 30 mg subcu daily for DVT prophylaxis 

for at least 20 days.  Fall precautions in place.  Patient  will continue with 

physical therapy.  3.  During the course of his stay patient had gross 

hematuria.  Patient was seen and evaluated by urology.  Cystoscopy was 

performed.  Excoriation of the left penile bulbar urethral area was noted. This 

was likely from trauma from recent reddy removal.  Hematuria was controlled.  

Patient did well after cystoscopy.  Urine culture negative for infection.  At 

discharge patient will continue with Reddy catheter for 1 more week.  After 

that time Reddy catheter can be removed.  Recommend to follow up with urology 

within 1-2 weeks to follow up this hospitalization.  4.  Patient also had some 

dysphagia prior to discharge to skilled facility.  Patient was seen and 

evaluated by speech therapy.  Modified barium swallow performed.  Patient found 

to have mild to moderate oral dysphagia, mild to severe pharyngeal dysphagia 

dietary recommendations included regular solids with nectar thickened liquids.  

Home medications to be administered one at a time is recommended.  Compensatory 

strategies to be continued.  Patient to remain upright during all oral intake 

and for at least 45 min after eating.  Patient to continue with eating at a 

slower rate, small bites, small sips, alternate bites/sips, multiple swallows, 

and  increased rest between presentations is recommended.  Patient would 

benefit from 5-6 small meals a day instead of 3 large moderate meals.  Patient 

to continue with speech therapy.  Initial CT neck showed possible large uvula.  

Evaluation by ENT as an outpatient is recommended.  Further recommendation is 

to follow up with ENT and/or GI for further recommendation.  5.  Patient with 

underlying Parkinson's. This has remained stable.  At discharge he will 

continue with carbidopa levodopa 25/100 mg 2 pills 4 times a day.  Recommend 

follow up with neurology as an outpatient.  6.  Patient with underlying seizure 

disorder.  At discharge he will continue with Dilantin  mg 4 pills at 

bedtime. Recommend follow up with neurology as an outpatient.  7.  Patient with 

dementia.  Patient will continue with Aricept 10 mg daily.  Recommend to follow 

up with neurology as directed.  8.  Patient with hypertension.  At discharge he 

will continue with lisinopril 10 mg 1 pill daily.  Recommend to maintain blood 

pressures less 150/80.  Further adjustment can be done by his PCP.  9.  Patient 

with hyperlipidemia.  At discharge he will continue with Zetia 10 mg daily and 

Crestor 20 mg daily.  10.  Patient with history of diabetes.  This appears to 

be diet controlled.  Continue to monitor blood sugars.  Recommend to maintain 

blood sugars less 140 fasting and less than 200 after meals.  Further 

evaluation and monitoring can be done by his PCP.  11.  Patient likely with 

underlying anemia of chronic disease. This remained stable postoperatively and 

with recent gross hematuria.  Recommend to follow up with lab-CBC in 1-2 weeks 

to monitor stability.


Diet: ADA (With nectar thickened liquids)


Activity: Fall precautions


Time spent managing pt's care (in minutes): 55

## 2019-04-17 NOTE — OP
Date of Procedure:  04/09/2019



Surgeon:  Johnny Lucas MD



Assistant:  CHARLENE Cobb, who gave very necessary first assist services throughout this procedur
e.



Preoperative Diagnosis:  Closed, displaced right femoral neck fracture.



Postoperative Diagnosis:  Closed, displaced right femoral neck fracture.



Primary Procedure:  Insertion of unipolar cemented hemiarthroplasty for right femoral neck fracture.



Indications:  This 78-year-old  male was walking in the dark and forgot about 3 steps at the
 end of the fair to step off into the air and tumbled down those steps.  He denied dizziness and loss
 of consciousness and was brought to the emergency department at [facility] where x-ra
ys have shown a displaced femoral neck fracture of the right proximal femur.  The patient has been cl
eared for surgery and after discussion of risks and benefits with all questions answered, he has elec
umberto to proceed with insertion of cemented unipolar hemiarthroplasty.  The patient was taken to the op
erating room and given a general anesthesia with intubation.  He was then moved to the operative tabl
e and placed in the left lateral position with bolsters for the lumbar spine.  The abdomen and the pu
bic ramus to maintain his position.  Traction was placed at the right ankle and prepping was carried 
out with Betadine scrub and paint from the ribcage to the ankle and DuraPrep stick used for prep of t
he ankle and foot.  An impervious stockinette was applied and secured with Coban to just above the kn
ee.  The rest of the operative field was draped out to maintain mobility and circumferential access t
o the right thigh and hip area.  The incision was drawn and then IO band sticky drape was applied ove
r the operative area and sealed off the edges of the sterile sheets.  The incision was made sharply t
hrough skin and subcutaneous tissue down to the fascia dereck.  Then Bovie coagulation was used to stop
 bleeders and Bovie cutting was used to incise the fascia dereck and divided proximally to the gluteus 
anny, which was divided sharply and bluntly with finger pressure.  Self retainers were placed on t
he fascia dereck and gluteus anny layers, and with fat exposure, the cutting Bovie was utilized to t
gen down the posterior aspect of the proximal femur attachments of the external rotators and quadratu
s for the superior aspect to expose the femoral neck, which had been broken and impaled to a degree w
ith the adjacent fragment.  The rongeur was used to remove shards of the calcar and gain access with 
retractors to allow the calcar cutting guide to trav and then used the oscillating saw to cut the erik
ropriate calcar incision.  Fragments of calcar were removed and then the corkscrew was inserted into 
the femoral head.  Once control was gained, a skid was slipped under it and that allowed leverage to 
remove the femoral head, which was measured at 54 mm in diameter.  The femoral trial 54 mm in diamete
r was a good fit and attention was turned then to preparation of the proximal femur.  The  
was utilized followed by the initiator and canal finder and then the lateralizer.  At that point, florinda
mers size 1 through 4 were utilized and then broaches 1 through 4 were utilized before sank a little 
bit in the femoral neck, and the 5 was used after the 5 broach was tapped into position for trialing.
  Trialing off the 5 caused selection of the -3 mm neck and then after reduction using the 54 mm head
 ball.  This was accepted as quite stable with full flexion and internal rotation giving no indicatio
n of dislocation.  The pistoning revealed a good reduction and aligning leg length measurement was fe
lt to be equal.  Then all trial components were removed and intramedullary plug was placed in the fem
ur, then Simpulse lavage bottle brush and dry sponge were used in sequence while cement was being mix
ed with 2 batches of simplex HV with gentamicin mixed in a cement gun for pressurized technique.  The
 pressurized technique was utilized with the size 5 stem slipped into position and held there for 18 
minutes while the cement set.  There were no problems noted by anesthesia and the -3 neck and a 54 mm
 head ball were tapped into position and following reduction again, the range of motion was quite goo
d and there was good stability for pistoning and flexion of the hip with internal rotation.  Then, Si
mpulse lavage was utilized around the reduced femoral head and the posterior capsule and piriformis t
endon were repaired using #1 Vicryl interrupted sutures.  The external rotators and quadratus were re
paired to the posterior margin of the vastus lateralis fascia with interrupted sutures of #1 Vicryl a
nd the fascia dereck was repaired with interrupted #1 Vicryl sutures reaching the fascia of the gluteus
 anny, it was also repaired with interrupted sutures.  Then, the deep subcu was closed with interr
upted sutures of #1 Vicryl and 2-0 Vicryl was used for the superficial subcutaneous closure.  Skin st
aples were used for the final skin closure.  The incision was injected with 30 mL of 0.25% Marcaine w
ith epinephrine.  The incision was then covered with an Aquacel bandage.  The patient was moved to [facility] bed and taken to the recovery room having tolerated this procedure well.  Estimated blood loss was 
250 mL.  The femoral head and calcar shards and soft-tissue debrided were sent to pathology.  The pat
ient had a urinary catheter inserted just prior to surgery.  He also had 1 g of Ancef given 30 minute
s prior to the incision with tranexamic acid 1 g given at the time of the incision, another 1 g was g
iven at the time of initiating closure.





LUMA/BETTY

DD:  04/16/2019 16:57:32Voice ID:  [id number]

DT:  04/17/2019 03:10:53Report ID:  [id number]

## 2020-09-22 ENCOUNTER — HOSPITAL ENCOUNTER (EMERGENCY)
Dept: HOSPITAL 97 - ER | Age: 80
Discharge: HOME | End: 2020-09-22
Payer: MEDICARE

## 2020-09-22 VITALS — TEMPERATURE: 98.2 F

## 2020-09-22 VITALS — DIASTOLIC BLOOD PRESSURE: 86 MMHG | OXYGEN SATURATION: 99 % | SYSTOLIC BLOOD PRESSURE: 143 MMHG

## 2020-09-22 DIAGNOSIS — I10: ICD-10-CM

## 2020-09-22 DIAGNOSIS — G20: ICD-10-CM
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
## 2020-09-22 DIAGNOSIS — M25.551: Primary | ICD-10-CM

## 2020-09-22 PROCEDURE — 99284 EMERGENCY DEPT VISIT MOD MDM: CPT

## 2020-09-22 PROCEDURE — 72170 X-RAY EXAM OF PELVIS: CPT

## 2020-09-22 PROCEDURE — 72192 CT PELVIS W/O DYE: CPT

## 2020-09-22 NOTE — RAD REPORT
EXAM DESCRIPTION:  CT - Pelvis Wo Cont - 9/22/2020 7:38 pm

 

CLINICAL HISTORY:  fall, pain

Fall, right hip pain

 

COMPARISON:  Pelvis dated 9/22/2020; Hip Right 2 View dated 9/22/2020

 

TECHNIQUE:  All CT scans are performed using dose optimization technique as appropriate and may inclu
de automated exposure control or mA/KV adjustment according to patient size.

 

FINDINGS:  A right total hip arthroplasty is noted. There is subtle heterotopic bone projecting anter
iorly and posteriorly from the femoral trochanters. There is a subtle lucency within some of this het
erotopic bone particularly anteriorly. It is possible the patient has subtle fracture through these a
reas heterotopic bone. This is an equivocal finding, however.

 

Right hip hardware is intact. There is no evidence of hardware loosening. Sacral ala are intact. No p
elvic fracture. Moderately severe osteoarthritis of the left hip. Prominent lumbar spondylosis.

 

IMPRESSION:  Subtle areas of lucency are seen within heterotopic bone projecting from the trochanteri
c region on the right, particularly anteriorly. This could indicate subtle nondisplaced fracture thro
ugh this bone, however this is an equivocal finding and doubtful to be of any clinically relevant sig
nificance.

## 2020-09-22 NOTE — RAD REPORT
EXAM DESCRIPTION:  RAD - Pelvis - 9/22/2020 6:48 pm

 

CLINICAL HISTORY:  PAIN

Fall, hip pain

 

COMPARISON:  Pelvis dated 4/9/2019; Hip Right 2 View dated 9/22/2020

 

FINDINGS:  Right total hip arthroplasty is noted. Severe osteoarthritis is present left hip. No dislo
cation is seen.

## 2020-09-22 NOTE — RAD REPORT
EXAM DESCRIPTION:  RAD - Hip Right 2 View - 9/22/2020 6:48 pm

 

CLINICAL HISTORY:  PAIN

Fall, hip pain

 

COMPARISON:  Hip Right 2 View dated 4/7/2019

 

FINDINGS:  Right total hip arthroplasty is noted. No evidence of hardware complication.

## 2020-09-22 NOTE — EDPHYS
Physician Documentation                                                                           

 [facility]                                                                 

Name: Riky Castro                                                                             

Age: 80 yrs                                                                                       

Sex: Male                                                                                         

: 1940                                                                                   

MRN: [id number]                                                                                   

Arrival Date: 2020                                                                          

Time: 16:40                                                                                       

Account#: [id number]                                                                            

Bed 7                                                                                             

Private MD: Cristian Lara T                                                                        

ED Physician Ken Mccloud                                                                         

HPI:                                                                                              

                                                                                             

18:20 This 80 yrs old  Male presents to ER via Wheelchair with complaints of Fall    cp  

      Injury, Hip Pain.                                                                           

18:20 Details of fall: The patient fell from an upright position, while walking. Onset: The   cp  

      symptoms/episode began/occurred today. Associated injuries: The patient sustained right     

      hip. Severity of symptoms: in the emergency department the symptoms are unchanged,          

      despite home interventions.                                                                 

                                                                                                  

Historical:                                                                                       

- Allergies:                                                                                      

17:06 No Known Allergies;                                                                     em  

- PMHx:                                                                                           

17:06 Diabetes - NIDDM; High Cholesterol; Hyperlipidemia; Hypertension; Parkinsons; Seizures; em  

- PSHx:                                                                                           

17:06 Appendectomy; right hip;                                                                em  

                                                                                                  

- Immunization history:: Adult Immunizations up to date.                                          

- Social history:: Smoking status: Patient denies any tobacco usage or history of.                

                                                                                                  

                                                                                                  

ROS:                                                                                              

18:25 MS/extremity: Positive for pain, tenderness, of the right hip, Negative for decreased   cp  

      range of motion, deformity.                                                                 

18:25 Eyes: Negative for injury, pain, redness, and discharge.                                cp  

18:25 Constitutional: Negative for body aches, fever.                                             

18:25 Neck: Negative for pain with movement, pain at rest, stiffness.                             

18:25 Cardiovascular: Negative for chest pain.                                                    

18:25 Respiratory: Negative for cough, shortness of breath, wheezing.                             

18:25 Abdomen/GI: Negative for abdominal pain, nausea, vomiting, and diarrhea.                    

18:25 Back: Negative for pain at rest, pain with movement.                                        

18:25 Neuro: Negative for altered mental status, headache, loss of consciousness, syncope,        

      weakness.                                                                                   

18:25 All other systems are negative.                                                             

                                                                                                  

Exam:                                                                                             

18:30 Constitutional: The patient appears in no acute distress, alert, awake,                 cp  

      non-diaphoretic, non-toxic, well developed, well nourished.                                 

18:30 Head/Face:  Normocephalic, atraumatic.                                                  cp  

18:30 Eyes: Periorbital structures: appear normal, Conjunctiva: normal, no exudate, no            

      injection, Sclera: no appreciated abnormality, Lids and lashes: appear normal,              

      bilaterally.                                                                                

18:30 ENT: External ear(s): are unremarkable, Nose: is normal, Posterior pharynx: Airway: no      

      evidence of obstruction, patent.                                                            

18:30 Neck: C-spine: vertebral tenderness, is not appreciated, crepitus, is not appreciated,      

      ROM/movement: is normal, is supple, without pain, no range of motions limitations.          

18:30 Chest/axilla: Inspection: normal, Palpation: is normal, no crepitus, no tenderness.         

18:30 Cardiovascular: Rate: normal, Rhythm: regular.                                              

18:30 Respiratory: the patient does not display signs of respiratory distress,  Respirations:     

      normal, no use of accessory muscles, labored breathing, is not present, Breath sounds:      

      are clear throughout, no decreased breath sounds.                                           

18:30 Abdomen/GI: Inspection: abdomen appears normal, Palpation: abdomen is soft and              

      non-tender, in all quadrants.                                                               

18:30 Back: vertebral tenderness, is not appreciated.                                             

18:30 Musculoskeletal/extremity: Extremities: grossly normal except: noted in the right hip:      

      pain, tenderness, There is no evidence of  decreased ROM, deformity, ROM: limited           

      passive range of motion due to pain, in the right hip.                                      

18:30 Neuro: Orientation: to person, place \T\ time. Mentation: is normal.                        

                                                                                                  

Vital Signs:                                                                                      

17:04  / 80; Pulse 85; Resp 18; Temp 98.2; Pulse Ox 100% on R/A; Weight 70.31 kg;       em  

      Height 6 ft. 2 in. (187.96 cm);                                                             

18:45  / 86; Pulse 85; Resp 16; Pulse Ox 99% ;                                          sv  

21:06  / 87; Pulse 87; Resp 16 S; Temp 97.9(O); Pulse Ox 100% on R/A; Pain 0/10;        bb  

17:04 Body Mass Index 19.90 (70.31 kg, 187.96 cm)                                             em  

                                                                                                  

[place] Coma Score:                                                                               

18:45 Eye Response: spontaneous(4). Verbal Response: oriented(5). Motor Response: obeys       sv  

      commands(6). Total: 15.                                                                     

                                                                                                  

Trauma Score (Adult):                                                                             

18:45 Eye Response: spontaneous(1); Verbal Response: oriented(1); Motor Response: obeys       sv  

      commands(2); Systolic BP: > 89 mm Hg(4); Respiratory Rate: 10 to 29 per min(4); [place]     

      Score: 15; Trauma Score: 12                                                                 

                                                                                                  

MDM:                                                                                              

18:12 Patient medically screened.                                                             cp  

19:00 Differential diagnosis: contusion, fracture, multiple trauma, dislocation.              cp  

20:43 Data reviewed: vital signs, nurses notes, radiologic studies, CT scan, plain films, I   cp  

      have discussed the patient's presentation/case with the attending Emergency Department      

      Physician; and as a result, I will discharge patient.                                       

                                                                                                  

                                                                                             

17:36 Order name: Hip Right 2 View XRAY; Complete Time: 20:19                                 kb  

                                                                                             

17:36 Order name: Pelvis XRAY; Complete Time: 20:19                                           kb  

                                                                                             

19:02 Order name: CT Pelvis wo Cont; Complete Time: 20:19                                     cp  

                                                                                                  

Administered Medications:                                                                         

18:48 Drug: Hydrocodone-Acetaminophen (7.5 mg-325 mg) 1 tabs Route: PO;                       tw2 

19:57 Follow up: Response: Pain is decreased; RASS: Alert and Calm (0)                        bb  

                                                                                                  

                                                                                                  

Disposition:                                                                                      

20 20:44 Discharged to Home. Impression: Pain in right hip, Fall on same level from         

  slipping, tripping and stumbling.                                                               

- Condition is Stable.                                                                            

- Discharge Instructions: Hip Pain.                                                               

- Prescriptions for Mobic 7.5 mg Oral Tablet - take 1 tablet by ORAL route once daily             

  take with food; 20 tablet. Tylenol- Codeine #3 300-30 mg Oral Tablet - take 2 tablets           

  by ORAL route every 6 hours As needed; 20 tablet.                                               

- Medication Reconciliation Form, Thank You Letter, Antibiotic Education, Prescription            

  Opioid Use form.                                                                                

- Follow up: Private Physician; When: 2 - 3 days; Reason: Recheck today's complaints.             

- Problem is new.                                                                                 

- Symptoms have improved.                                                                         

                                                                                                  

                                                                                                  

                                                                                                  

Addendum:                                                                                         

2020                                                                                        

     07:17 Co-signature as Attending Physician, Ken Mccloud MD.                                    r
n

                                                                                                  

Signatures:                                                                                       

Dispatcher [employer]                           Javier Díaz RN                        Ken Brown MD MD rn Page, Corey, PA PA   cp                                                   

Dianna Holden RN                          RN   tw2                                                  

Mattie Peters RN                       RN   ll1                                                  

Jinny Sesay RN   bb                                                   

                                                                                                  

Corrections: (The following items were deleted from the chart)                                    

                                                                                             

19:38 18:14 Femur Right+RAD.RAD.BRZ ordered. EDMS                                             EDMS

20:59 20:44 2020 20:44 Discharged to Home. Impression: Pain in right hip; Fall on same  ll1 

      level from slipping, tripping and stumbling. Condition is Stable. Forms are Medication      

      Reconciliation Form, Thank You Letter, Antibiotic Education, Prescription Opioid Use.       

      Follow up: Private Physician; When: 2 - 3 days; Reason: Recheck today's complaints.         

      Problem is new. Symptoms have improved. cp                                                  

                                                                                                  

**************************************************************************************************

## 2020-09-22 NOTE — ER
Nurse's Notes                                                                                     

 [facility]                                                                 

Name: Riky Castro                                                                             

Age: 80 yrs                                                                                       

Sex: Male                                                                                         

: 1940                                                                                   

MRN: [id number]                                                                                   

Arrival Date: 2020                                                                          

Time: 16:40                                                                                       

Account#: [id number]                                                                            

Bed 7                                                                                             

Private MD: Cristian Lara T                                                                        

Diagnosis: Pain in right hip;Fall on same level from slipping, tripping and stumbling             

                                                                                                  

Presentation:                                                                                     

                                                                                             

17:04 Chief complaint: Patient states: mechanical fall and landed on right hip, had hip       em  

      replaced in the right hip, was able to ambulate, just wants to make sure nothing is         

      cracked. Coronavirus screen: Client denies travel out of the [place] in the last 14 days.      

      Ebola Screen: Patient negative for fever greater than or equal to 101.5 degrees             

      Fahrenheit, and additional compatible Ebola Virus Disease symptoms Patient denies           

      exposure to infectious person. Patient denies travel to an Ebola-affected area in the       

      21 days before illness onset. No symptoms or risks identified at this time. Initial         

      Sepsis Screen: Does the patient meet any 2 criteria? No. Patient's initial sepsis           

      screen is negative. Does the patient have a suspected source of infection? No.              

      Patient's initial sepsis screen is negative. Risk Assessment: Do you want to hurt           

      yourself or someone else? Patient reports no desire to harm self or others. Onset of        

      symptoms was 2020.                                                            

17:04 Method Of Arrival: Wheelchair                                                           em  

17:04 Acuity: ZAC 3                                                                           em  

                                                                                                  

Historical:                                                                                       

- Allergies:                                                                                      

17:06 No Known Allergies;                                                                     em  

- PMHx:                                                                                           

17:06 Diabetes - NIDDM; High Cholesterol; Hyperlipidemia; Hypertension; Parkinsons; Seizures; em  

- PSHx:                                                                                           

17:06 Appendectomy; right hip;                                                                em  

                                                                                                  

- Immunization history:: Adult Immunizations up to date.                                          

- Social history:: Smoking status: Patient denies any tobacco usage or history of.                

                                                                                                  

                                                                                                  

Screenin:09 Abuse screen: Denies threats or abuse. Nutritional screening: No deficits noted.        tw2 

      Tuberculosis screening: No symptoms or risk factors identified. Fall Risk Secondary         

      diagnosis (15 points) impaired mobility.                                                    

                                                                                                  

Primary Survey:                                                                                   

18:15 NO uncontrolled hemorrhage observed. A: The patient is alert. Airway: patent, No        sv  

      supplemental oxygen in use on arrival. Oral cavity: clear, Trachea midline.                 

      Breathing/Chest: Respiratory pattern: regular, Respiratory effort: spontaneous,             

      unlabored, Chest inspection: symmetrical rise and fall of the chest. Circulation:           

      Pulses: palpable right dorsalis pedis artery and left dorsalis pedis artery. Skin           

      color: pink, Skin temperature: warm, dry. Disability Alert. Exposure/Environment: All       

      clothing and personal items were removed. Forensic evidence collection is not deemed to     

      be indicated at this time. Items placed in patient belonging bag. There is no evidence      

      of uncontrolled external bleeding. No obvious injuries are noted at this time. A            

      warming method has been applied: A warm blanket has been provided to the patient.           

                                                                                                  

Secondary Survey:                                                                                 

18:15 HEENT: No deficits noted. Gastrointestinal: No deficits noted. : No deficits noted.   sv  

      No signs and/or symptoms were reported regarding the genitourinary system.                  

      Musculoskeletal: Reports pain in right hip.                                                 

                                                                                                  

Assessment:                                                                                       

18:48 General: Appears in no apparent distress. slender, well groomed, Behavior is calm,      tw2 

      cooperative, appropriate for age. Pain: Complains of pain in RIGHT hip. Neuro: Level of     

      Consciousness is awake, alert, obeys commands, Oriented to person, place, time,             

      situation. Cardiovascular: Heart tones S1 S2 Patient's skin is warm and dry.                

      Respiratory: Airway is patent Respiratory effort is even, unlabored, Respiratory            

      pattern is regular, symmetrical, Breath sounds are clear bilaterally. GI: No signs          

      and/or symptoms were reported involving the gastrointestinal system. Abdomen is flat,       

      Bowel sounds present X 4 quads. : No signs and/or symptoms were reported regarding        

      the genitourinary system. EENT: No signs and/or symptoms were reported regarding the        

      EENT system. Derm: No signs and/or symptoms reported regarding the dermatologic system.     

      Musculoskeletal: Reports pain in RIGHT hip states "i can put weight on it, but it           

      really hurts and I had a hip replacement last April".                                       

19:00 Reassessment: Patient is alert, oriented x 3, equal unlabored respirations, skin        bb  

      warm/dry/pink. Neuro: Level of Consciousness is awake, alert, obeys commands, Oriented      

      to person, place, situation. Cardiovascular: Heart tones present Capillary refill < 3       

      seconds Patient's skin is warm and dry. Respiratory: Airway is patent Respiratory           

      effort is even, unlabored. GI: No signs and/or symptoms were reported involving the         

      gastrointestinal system. Derm: Skin is pale. Musculoskeletal: Circulation, motion, and      

      sensation intact. Reports pain in right hip.                                                

19:33 Reassessment: pt to CT scan via stretcher with CT tech.                                 bb  

20:30 Reassessment: Patient is alert, oriented x 3, equal unlabored respirations, skin        bb  

      warm/dry/pink. pt states he is not feeling any pain now verbalized understanding of and     

      agrees to plan of care discharge instructions given pt assisted to exit via wheelchair      

      accompanied by family Patient states feeling better. Patient states symptoms have           

      improved.                                                                                   

                                                                                                  

Vital Signs:                                                                                      

17:04  / 80; Pulse 85; Resp 18; Temp 98.2; Pulse Ox 100% on R/A; Weight 70.31 kg;       em  

      Height 6 ft. 2 in. (187.96 cm);                                                             

18:45  / 86; Pulse 85; Resp 16; Pulse Ox 99% ;                                          sv  

21:06  / 87; Pulse 87; Resp 16 S; Temp 97.9(O); Pulse Ox 100% on R/A; Pain 0/10;        bb  

17:04 Body Mass Index 19.90 (70.31 kg, 187.96 cm)                                             em  

                                                                                                  

[place] Coma Score:                                                                               

18:45 Eye Response: spontaneous(4). Verbal Response: oriented(5). Motor Response: obeys       sv  

      commands(6). Total: 15.                                                                     

                                                                                                  

Trauma Score (Adult):                                                                             

18:45 Eye Response: spontaneous(1); Verbal Response: oriented(1); Motor Response: obeys       sv  

      commands(2); Systolic BP: > 89 mm Hg(4); Respiratory Rate: 10 to 29 per min(4); [place]     

      Score: 15; Trauma Score: 12                                                                 

                                                                                                  

ED Course:                                                                                        

16:40 Patient arrived in ED.                                                                  ag5 

16:40 Cristian Lara MD is Private Physician.                                                   ag5 

17:06 Triage completed.                                                                       em  

17:06 Arm band placed on. Antipyretics given from triage as ordered by an ER provider.        em  

17:55 Bravo Jean PA is PHCP.                                                                cp  

17:55 Ken Mccloud MD is Attending Physician.                                                cp  

17:55 Bed in low position. Call light in reach. Side rails up X 1. Adult w/ patient. Pulse ox tw2 

      on. NIBP on.                                                                                

18:35 Dianna Holden, EMELY is Primary Nurse.                                                        tw2 

18:48 Hip Right 2 View XRAY In Process Unspecified.                                           EDMS

18:48 Pelvis XRAY In Process Unspecified.                                                     EDMS

19:00 Report given to EMELY Stearns \T\ EMELY Jiménez.                                                    tw2

19:37 CT Pelvis wo Cont In Process Unspecified.                                               EDMS

21:07 No provider procedures requiring assistance completed. Patient did not have IV access   bb  

      during this emergency room visit.                                                           

                                                                                                  

Administered Medications:                                                                         

18:48 Drug: Hydrocodone-Acetaminophen (7.5 mg-325 mg) 1 tabs Route: PO;                       tw2 

19:57 Follow up: Response: Pain is decreased; RASS: Alert and Calm (0)                        bb  

                                                                                                  

                                                                                                  

Intake:                                                                                           

18:45 PO: 0ml; Total: 0ml.                                                                    sv  

                                                                                                  

Output:                                                                                           

18:45 Urine: 0ml; Total: 0ml.                                                                 sv  

                                                                                                  

Outcome:                                                                                          

20:44 Discharge ordered by MD.                                                                cp  

20:59 Patient left the ED.                                                                    ll1 

21:07 Discharged to home via wheelchair, with family.                                         bb  

21:07 Condition: stable                                                                           

21:07 Discharge instructions given to patient, family, Instructed on discharge instructions,      

      follow up and referral plans. medication usage, Demonstrated understanding of               

      instructions, follow-up care, medications, Prescriptions given X 2.                         

                                                                                                  

Signatures:                                                                                       

Dispatcher [employer]                           Shala Matthew, RN                    RN   Javier Amaro, EMELY                        RN   Jinny Casarez RN RN bb Page, Corey, PA PA cp Wise, Tara, RN                          RN   [facility]                                                  

Marisol Santo                                [facility]                                                  

Mattie Peters, EMELY                       RN   ll1                                                  

                                                                                                  

**************************************************************************************************

## 2020-09-22 NOTE — XMS REPORT
Summary of Care: 20 - 20

                          Created on:February 15, 2084



Patient:LAUREL CHATMAN

Sex:Male

:1940

External Reference #:[id number]





Demographics







                          Address                   [address] [place] [address]

 

                          Home Phone                [phone]

 

                          Email Address             [email or web address]

 

                          Preferred Language        Unknown

 

                          Marital Status            Unknown

 

                          [place] Affiliation     Unknown

 

                          Race                      Other

 

                          Additional Race(s)        Unavailable

 

                          Ethnic Group              Not  or 









Author







                          Organization              [facility] [place]

 

                          Address                   [address] [place] [address]-

 

                          phone [phone]









Encounter

HQ Encntr_alias(FIN) [id number] Date(s): 20 - 20

[facility] [address] [place] [address]-     [phone]

Attending Physician: Jose Hahn MD

Referring Physician: Cristian Lara MD





Vital Signs

No data available for this section



Problem List







             Condition    Effective Dates Status       Health Status Informant

 

             Dementia(Confirmed)              Active                    

 

             Hyperlipidemia(Confirmed)              Active                    

 

             Hypertension(Confirmed)              Active                    

 

             Parkinson disease(Confirmed)              Active                   

 

 

             Intractable complex partial              Active                    



             epilepsy(Confirmed)                                        







Allergies, Adverse Reactions, Alerts

No Known Medication Allergies



Medications

No data available for this section



Results

No data available for this section



Immunizations

No data available for this section



Procedures

No data available for this section



Social History







                          Social History Type       Response

 

                          Smoking Status            Never smoker; Exposure to To

bacco Smoke None; Cigarette Smoking 

Last 365 Days No; Reg Smoking Cessation Counseling No



                                                    entered on: 20







Assessment and Plan

No data available for this section

## 2020-09-22 NOTE — XMS REPORT
Summary of Care: 20 - 20

                             Created on:May 19, 2040



Patient:LAUREL CHATMAN

Sex:Male

:1940

External Reference #:[id number]





Demographics







                          Address                   [address] [place] [address]

 

                          Home Phone                [phone]

 

                          Email Address             [email or web address]

 

                          Preferred Language        Unknown

 

                          Marital Status            Unknown

 

                          [place] Affiliation     Unknown

 

                          Race                      Other

 

                          Additional Race(s)        Unavailable

 

                          Ethnic Group              Not  or 









Author







                          Organization              [facility]

 

                          Address                   [address] [place] [address]-

 

                          Phone                     [phone]









Encounter

HQ Xander(FIN) [id number] Date(s): 20 - 20

[facility] [address] [place], [place] [address]- [phone]

Discharge Disposition: Home or Self Care

Attending Physician: Jose Hahn MD

Referring Physician: Cristian Lara MD





Vital Signs







                          Most recent to oldest [Reference Range]: 1

 

                          Height                    182.88 cm



                                                    (20 2:05 PM)

 

                          Blood Pressure [/60-90 mmHg] 118/60 mmHg



                                                    (20 2:05 PM)

 

                          Respiratory Rate [14-20 BRMIN] 16 BRMIN



                                                    (20 2:05 PM)

 

                          Peripheral Pulse Rate [ bpm] 81 bpm



                                                    (20 2:05 PM)

 

                          Weight                    70.455 kg



                                                    (20 2:05 PM)

 

                          Body Mass Index           21.07 m2



                                                    (20 2:05 PM)







Problem List







             Condition    Effective Dates Status       Health Status Informant

 

             Dementia(Confirmed)              Active                    

 

             Hyperlipidemia(Confirmed)              Active                    

 

             Hypertension(Confirmed)              Active                    

 

             Parkinson disease(Confirmed)              Active                   

 

 

             Intractable complex partial              Active                    



             epilepsy(Confirmed)                                        







Allergies, Adverse Reactions, Alerts

No Known Medication Allergies



Medications

pramipexole 0.5 mg oral tablet

0.5 mg = 1 tab, PO, BID, # 60 tab, 3 Refill(s), Pharmacy: [facility] [place], 182.88, cm, 20 14:05:00 CDT, Height, 70.455, kg, 20 
14:05:00 CDT, Weight

Start Date: 20

Stop Date: 20

Status: Ordered



Results

No data available for this section



Immunizations

No data available for this section



Procedures

No data available for this section



Social History







                          Social History Type       Response

 

                          Smoking Status            Never smoker; Exposure to To

bacco Smoke None; Cigarette Smoking 

Last 365 Days No; Reg Smoking Cessation Counseling No



                                                    entered on: 20







Assessment and Plan

No data available for this section

## 2020-09-22 NOTE — XMS REPORT
Summary of Care: 20 - 20

                           Created on:2041



Patient:LAUREL CHATMAN

Sex:Male

:1940

External Reference #:[id number]





Demographics







                          Address                   [address] [place] [id number]

 

                          Home Phone                [phone]

 

                          Email Address             [email or web address]

 

                          Preferred Language        Unknown

 

                          Marital Status            Unknown

 

                          [employer] Affiliation     Unknown

 

                          Race                      Other

 

                          Additional Race(s)        Unavailable

 

                          Ethnic Group              Not  or 









Author







                          Organization              [facility]

 

                          Address                   [address] [place] [address]-

 

                          phone [phone]









Encounter

HQ Encntr_alias(FIN) [id number] Date(s): 20 - 20

[facility] [address] [place] [address]-     [phone]

Attending Physician: Jose Hahn MD

Referring Physician: Cristian Lara MD





Vital Signs

No data available for this section



Problem List







             Condition    Effective Dates Status       Health Status Informant

 

             Dementia(Confirmed)              Active                    

 

             Hyperlipidemia(Confirmed)              Active                    

 

             Hypertension(Confirmed)              Active                    

 

             Parkinson disease(Confirmed)              Active                   

 

 

             Intractable complex partial              Active                    



             epilepsy(Confirmed)                                        







Allergies, Adverse Reactions, Alerts

No Known Medication Allergies



Medications

No data available for this section



Results

No data available for this section



Immunizations

No data available for this section



Procedures

No data available for this section



Social History







                          Social History Type       Response

 

                          Smoking Status            Never smoker; Exposure to To

bacco Smoke None; Cigarette Smoking 

Last 365 Days No; Reg Smoking Cessation Counseling No



                                                    entered on: 20







Assessment and Plan

No data available for this section

## 2020-09-22 NOTE — XMS REPORT
Continuity of Care Document

                          Created on:2020



Patient:LAUREL CHATMAN

Sex:Male

:1940

External Reference #:[id number]





Demographics







                          Address                   [address] [place] [address]

 

                          Phone                     Unavailable

 

                          Preferred Language        en-US

 

                          Marital Status            Unknown

 

                          Christian Affiliation     Unknown

 

                          Race                      Unknown

 

                          Ethnic Group              Unknown









Author







                          Organization              [facility]









Care Team Providers







                    Name                Role                Phone

 

                    [facility] Unavailable         Un

available









Problems







          Problem   Status    Onset     Classification Date      Comments  Sourc

e



                              Date                Reported            



                                                                      



                                                                      



                                                                      

 

          Dementia  Active              Problem   2020           [facility]



          (disorder)                                                   Neuro



                                                                      



                                                                      

 

          Hyperlipidemia Active              Problem   2020           [facility]

[facility]



          (disorder)                                                   Neuro



                                                                      



                                                                      

 

          Hypertensive Active              Problem   2020           [facility]

r



          disorder, systemic                                                   N

euro



          arterial                                                    



          (disorder)                                                   



                                                                      

 

          Parkinson's Active              Problem   2020           [facility]



          disease (disorder)                                                   N

euro



                                                                      



                                                                      

 

          Partial epilepsy Active              Problem   2020           [facility]

[facility]



          with impairment of                                                   N

euro



          consciousness                                                   



          (disorder)                                                   



                                                                      







Medications







        Medication Details Route   Status  Patient Ordering Order   Source



                                        Instructions Provider Date    



                                                                



                                                                



                                                                

 

        pramipexole 0.5 0.5 mg = 1         Active                   [facility]

er



        mg oral tablet tab, PO,                                 020     [facility]



                BID, # 60                                         



                tab, 3                                          



                Refill(s),                                         



                Pharmacy:                                         



                [facility],                                         



                182.88, cm,                                         



                20                                         



                14:05:00                                         



                CDT,                                            



                Height,                                         



                70.455, kg,                                         



                20                                         



                14:05:00                                         



                CDT, Weight                                         



                                                                



                                                                

 

        donepezil 10 mg = 1 tab,         Active                   [facility]



        oral tablet PO,                                     020     [facility]



                Bedtime, #                                         



                30 tab, 3                                         



                Refill(s),                                         



                Pharmacy:                                         



                [facility]                                         



                                                                



                                                                

 

        pramipexole 0.25 See             Active                   [facility]



        mg oral tablet Instruction                                 020     [facility]



                s, TAKE ONE                                         



                (1)                                             



                TABLET(S)                                         



                BY MOUTH                                         



                TWICE A                                         



                DAY., # 60                                         



                ea, 6                                           



                Refill(s),                                         



                Pharmacy:                                         



                [facility]                                         



                                                                



                                                                

 

        pramipexole 0.25 See             Active                  03/10/2 [facility]



        mg oral tablet Instruction                                 020     Ne[facility]



                s, # 60 ea,                                         



                TAKE ONE                                         



                (1)                                             



                TABLET(S)                                         



                BY MOUTH                                         



                TWICE A                                         



                DAY.,                                           



                Pharmacy:                                         



                [facility]                                         



                                                                



                                                                

 

        memantine 10 mg See             Active                  03/10/2 [facility]



        oral tablet Instruction                                 020     Neuro



                s, # 60 ea,                                         



                Refill(s)                                         



                2, TAKE ONE                                         



                (1)                                             



                TABLET(S)                                         



                BY MOUTH                                         



                TWICE A                                         



                DAY.,                                           



                Pharmacy:                                         



                [facility]                                         



                                                                



                                                                

 

        pramipexole 0.25 See             Inactive                 03/10/2 [facility]

r



        mg oral tablet Instruction                                 020     Neuro



                s, # 60 ea,                                         



                Refill(s)                                         



                2, TAKE ONE                                         



                (1)                                             



                TABLET(S)                                         



                BY MOUTH                                         



                TWICE A                                         



                DAY.,                                           



                Pharmacy:                                         



                [facility]                                         



                                                                



                                                                

 

        Memantine 10 mg = 1         Active                  10/17/2 [facility]



        hydrochloride 10 tab, PO,                                 019     Ne[facility]



        MG Oral Tablet BID, # 60                                         



        [Namenda] tab, 3                                          



                Refill(s),                                         



                Pharmacy:                                         



                [facility]                                         



                [facility] [id number]                                          



                                                                



                                                                

 

        Pramipexole 0.25 mg = 1         Active                  10/17/2 [facility]



        dihydrochloride tab, PO,                                 019     Neuro



        0.25 MG Oral BID, # 60                                         



        Tablet [Mirapex] tab, 3                                          



                Refill(s),                                         



                Pharmacy:                                         



                [facility]                                         



                [facility]                                         



                #[id number]                                          



                                                                



                                                                

 

        Memantine 5 mg = 1         Active                   [facility]



        hydrochloride 5 tab, PO,                                 019     [facility]



        MG Oral Tablet BID, # 60                                         



        [Namenda] tab, 3                                          



                Refill(s),                                         



                Pharmacy:                                         



                [facility]                                         



                [facility] [id number]                                          



                                                                



                                                                

 

        Pramipexole 0.125 mg =         Active                   [facility]



        dihydrochloride 1 tab, PO,                                 019     [facility]



        0.125 MG Oral BID, # 60                                         



        Tablet [Mirapex] tab, 3                                          



                Refill(s),                                         



                Pharmacy:                                         



                [facility]                                         



                [facility] [id number]                                          



                                                                



                                                                

 

        phenytoin 100 mg 400 mg = 4         Active                   [facility]

her



        oral capsule, cap, PO,                                 019     [facility]



        extended release Bedtime, #                                         



                120 cap, 3                                         



                Refill(s),                                         



                Pharmacy:                                         



                [facility]                                         



                [facility]                                         



                #[id number]                                          



                                                                



                                                                

 

        donepezil 10 mg 10 mg = 1         Active                   [facility]

r



        oral tablet tab, PO,                                 019     [facility]



                Bedtime, #                                         



                30 tab, 3                                         



                Refill(s),                                         



                Pharmacy:                                         



                [facility]                                         



                [facility]                                         



                #[id number]                                          



                                                                



                                                                

 

        Carbidopa 25 MG / 1 tab, PO,         Active                   [facility]

[facility]



        Levodopa 250 MG QID, # 120                                 019     [facility]



        Oral Tablet tab, 3                                          



                Refill(s),                                         



                Pharmacy:                                         



                [facility]                                         



                [facility]                                         



                #[id number]                                          



                                                                



                                                                

 

        montelukast 10 mg 10 mg = 1         Active                   [facility]

her



        oral tablet tab, PO,                                 019     [facility]



                Daily, 0                                         



                Refill(s)                                         



                                                                



                                                                

 

        Carbidopa 25 MG / 2 tab, PO,         Inactive                  [facility]

[facility]



        Levodopa 100 MG QID, # 120                                 019     [facility]



        Oral Tablet tab, 0                                          



                Refill(s)                                         



                                                                



                                                                

 

        phenytoin 100 mg 400 mg = 4         Inactive                  [facility]

roscoe



        oral capsule, cap, PO,                                 019     [facility]



        extended release Bedtime, #                                         



                90 cap, 0                                         



                Refill(s)                                         



                                                                



                                                                

 

        lisinopril 10 mg 10 mg = 1         Active                   [facility]

er



        oral tablet tab, PO,                                 019     Ne[facility]



                Daily, 0                                         



                Refill(s)                                         



                                                                



                                                                

 

        8 HR    1,300 mg =         Active                   [facility]



        Acetaminophen 650 2 tab, PO,                                 019     [facility]

ro



        MG Extended Q8H, 0                                          



        Release Tablet Refill(s)                                         



        [Tylenol]                                                 



                                                                

 

        Aspirin 81 MG 81 mg = 1         Active                   [facility]



        Enteric Coated tab, PO,                                 019     [facility]



        Tablet  Daily, # 90                                         



                tab, 3                                          



                Refill(s)                                         



                                                                



                                                                

 

        tramadol 50 mg = 1         Active                   [facility]



        hydrochloride 50 tab, PO,                                 019     [facility]



        MG Oral Tablet Q4H, PRN                                         



                Pain, # 60                                         



                tab, 0                                          



                Refill(s)                                         



                                                                



                                                                

 

        ezetimibe 10 mg 10 mg = 1         Active                   [facility]

r



        oral tablet tab, PO,                                 019     Ne[facility]



                Daily, 0                                         



                Refill(s)                                         



                                                                



                                                                

 

        rosuvastatin 20 20 mg = 1         Active                   [facility]

r



        mg oral tablet tab, PO,                                 019     Ne[facility]



                Daily, 0                                         



                Refill(s)                                         



                                                                



                                                                

 

        donepezil 10 mg 10 mg = 1         Inactive                  [facility]

er



        oral tablet tab, PO,                                 019     Ne[facility]



                Bedtime, 0                                         



                Refill(s)                                         



                                                                



                                                                

 

        Docusate Sodium 100 mg = 1         Active                   [facility]

er



        100 MG Oral cap, PO,                                 019     [facility]



        Capsule BID, 0                                          



                Refill(s)                                         



                                                                



                                                                







Allergies, Adverse Reactions, Alerts







        Substance Category Reaction Severity Reaction Status  Date    Comments S

ource



                                        type            Reported         



                                                                        



                                                                        



                                                                        

 

        No Known Assertion                 Drug                            [facility]

er



        Medication                         allergy                         [facility]



        Allergies                                                         



                                                                        



                                                                        







Immunizations







                                        No Data Provided for This Section







Results







                                        No Data Provided for This Section







Pathology Reports







                                        No Data Provided for This Section







Diagnostic Reports







                                        No Data Provided for This Section







Consultation Notes







                                        No Data Provided for This Section







Discharge Summaries







                                        No Data Provided for This Section







History and Physicals







                                        No Data Provided for This Section







Vital Signs







             Vital Sign   Value        Date         Comments     Source



                                                                 

 

             Systolic (mm Hg) 118          2020                [facility]

ro



                                                                 



                                                                 

 

             Diastolic (mm Hg) 60           2020                [facility]

uro



                                                                 



                                                                 

 

             Heart Rate   81           2020                [facility]



                                                                 



                                                                 

 

             Respitory Rate 16           2020                [facility]



                                                                 



                                                                 

 

             Height       182.88 cm    2020                [facility]



                                                                 



                                                                 

 

             Weight       70.455       2020                [facility]



                                                                 



                                                                 

 

             BMI Calculated 21.07        2020                [facility]



                                                                 



                                                                 

 

             Systolic (mm Hg) 125          2019                [facility]

ro



                                                                 



                                                                 

 

             Diastolic (mm Hg) 71           2019                [facility]

uro



                                                                 



                                                                 

 

             Heart Rate   86           2019                [facility]



                                                                 



                                                                 

 

             Respitory Rate 16           2019                [facility]



                                                                 



                                                                 

 

             Height       190.5 cm     2019                [facility]



                                                                 



                                                                 

 

             Weight       72.727       2019                [facility]



                                                                 



                                                                 

 

             BMI Calculated 20.04        2019                [facility]



                                                                 



                                                                 

 

             Systolic (mm Hg) 129          10/17/2019                [facility]

ro



                                                                 



                                                                 

 

             Diastolic (mm Hg) 70           10/17/2019                [facility]

uro



                                                                 



                                                                 

 

             Heart Rate   83           10/17/2019                [facility]



                                                                 



                                                                 

 

             Respitory Rate 16           10/17/2019                [facility]



                                                                 



                                                                 

 

             Height       190.5 cm     10/17/2019                [facility]



                                                                 



                                                                 

 

             Weight       72.727       10/17/2019                [facility]



                                                                 



                                                                 

 

             BMI Calculated 20.04        10/17/2019                [facility]



                                                                 



                                                                 

 

             Systolic (mm Hg) 89           2019                [facility]

ro



                                                                 



                                                                 

 

             Diastolic (mm Hg) 51           2019                [facility]

[facility]



                                                                 



                                                                 

 

             Heart Rate   83           2019                [facility]



                                                                 



                                                                 

 

             Respitory Rate 16           2019                [facility]



                                                                 



                                                                 

 

             Height       187.96 cm    2019                [facility]



                                                                 



                                                                 

 

             Weight       72.273       2019                [facility]



                                                                 



                                                                 

 

             BMI Calculated 20.46        2019                [facility]



                                                                 



                                                                 

 

             Weight       72.273       2019                [facility]



                                                                 



                                                                 

 

             BMI Calculated 22.86        2019                [facility]



                                                                 



                                                                 

 

             Height       177.8 cm     2019                [facility]



                                                                 



                                                                 

 

             Systolic (mm Hg) 126          2019                [facility]

ro



                                                                 



                                                                 

 

             Diastolic (mm Hg) 63           2019                [facility]

uro



                                                                 



                                                                 

 

             Heart Rate   79           2019                [facility]



                                                                 



                                                                 

 

             Respitory Rate 16           2019                [facility]



                                                                 



                                                                 







Encounters







       Location Location Encounter Encounter [facility] Attending ADM    [facility]     Stat

us Source



              Details Type   Number For    Provider Date   Date          



                                   Visit                              



                                                                      



                                                                      



                                                                      

 

                     Outpatient [id number]        Jose           [facility]                [place]



                                                                      



                                                                      



                                                                      



                                                                      

 

       [facility]           Outpatient [id number]        Jose           

[facility] [place]



       Neurology                             [facility]  /2019         Neuro



       [place]                                                         



                                                                      



                                                                      



                                                                      

 

                     Outpatient [id number]        Jose           [facility]                  [place]



                                                                      



                                                                      



                                                                      



                                                                      

 

       [facility]           Outpatient [id number]        Jose           

[facility]                             [facility]           [facility]



       [place]                                                         



                                                                      



                                                                      



                                                                      

 

                     Outpatient [id number]        [facility]  10/17         [facility]



                                          [facility]                [place]



                                                                      



                                                                      



                                                                      



                                                                      

 

       MNA           Outpatient [id number]        Jose  10/17  10/18         

[facility]



       [facility]                             [facility]           [facility]



       [place]                                                         



                                                                      



                                                                      



                                                                      

 

                     Outpatient [id number]        [facility]           [facility]



                                          [facility]                [place]



                                                                      



                                                                      



                                                                      



                                                                      

 

       [facility]           Outpatient [id number]        Jose           

[facility]



       [facility]                             [facility]           [facility]



       [place]                                                         



                                                                      



                                                                      



                                                                      

 

                     Outpatient [id number]        [facility]           [facility] 

[facility]



                                          [facility]  /                [place]



                                                                      



                                                                      



                                                                      



                                                                      

 

       [facility]           Ambulatory [id number]        Jose           

[facility]



       [facility]        Pre-Reg               [facility]  /2020         Neuro



       [place]                                                         



                                                                      



                                                                      



                                                                      

 

                     Outpatient [id number]        [facility]           [facility] 

[facility]



                                          [facility]  /                [place]



                                                                      



                                                                      



                                                                      



                                                                      

 

       [facility]           Outpatient [id number]        Cristian Lara      

    [facility]                                    /2020         Neuro



       [place]                                                         



                                                                      



                                                                      



                                                                      

 

                     Outpatient [id number]        Jose           [facility] 

[facility]



                                          [facility]  /                [place]



                                                                      



                                                                      



                                                                      



                                                                      

 

       MNA           Outpatient [id number]        Cristian Lara      

    [facility]



       [facility]                                    /2020         Neuro



       [place]                                                         



                                                                      



                                                                      



                                                                      

 

                     Outpatient [id number]        [facility]           [facility] 

[facility]



                                          [facility]  /2020                [place]



                                                                      



                                                                      



                                                                      



                                                                      

 

                     Outpatient [id number]        Jose           [facility] 

[facility]



                                          [facility]  /                [place]



                                                                      



                                                                      



                                                                      



                                                                      

 

       MNA           Ambulatory [id number]        Cristian Lara      

    [facility]        Pre-Reg                      /2020         Neuro



       [place]                                                         



                                                                      



                                                                      



                                                                      

 

       [facility]           Ambulatory [id number]        Cristian Lara      

    [facility]        Pre-Reg                      /2020         Neuro



       [place]                                                         



                                                                      



                                                                      



                                                                      

 

                     Outpatient [id number]        Jose           [facility] 

[facility]



                                          [facility]  [address]                [place]



                                                                      



                                                                      



                                                                      



                                                                      







Procedures







                                        No Data Provided for This Section







Assessment and Plan







                                        No Data Provided for This Section







Plan of Care







                                        No Data Provided for This Section







Social History







                    Social History      Date                Source



                                                            

 

                    Social History TypeResponse 2020          [facility]

o



                    Smoking Status                          



                                        Never smoker; Exposure to Tobacco Smoke 

None; Cigarette Smoking Last 365 Days 

No; Reg Smoking Cessation Counseling No                           



                    entered on: 20                     



                                                            







Family History







                                        No Data Provided for This Section







Advance Directives







                                        No Data Provided for This Section







Functional Status







                                        No Data Provided for This Section